# Patient Record
Sex: FEMALE | Race: WHITE | NOT HISPANIC OR LATINO
[De-identification: names, ages, dates, MRNs, and addresses within clinical notes are randomized per-mention and may not be internally consistent; named-entity substitution may affect disease eponyms.]

---

## 2017-03-04 PROBLEM — Z00.00 ENCOUNTER FOR PREVENTIVE HEALTH EXAMINATION: Status: ACTIVE | Noted: 2017-03-04

## 2017-03-09 NOTE — ASU PATIENT PROFILE, ADULT - PSH
H/O  section    S/P endometrial ablation Elective surgery  Right ear tube placement  H/O  section    S/P carpal tunnel release  Bilateral  S/P endometrial ablation

## 2017-03-10 ENCOUNTER — TRANSCRIPTION ENCOUNTER (OUTPATIENT)
Age: 52
End: 2017-03-10

## 2017-03-10 ENCOUNTER — OUTPATIENT (OUTPATIENT)
Dept: OUTPATIENT SERVICES | Facility: HOSPITAL | Age: 52
LOS: 1 days | End: 2017-03-10
Payer: COMMERCIAL

## 2017-03-10 VITALS
HEART RATE: 85 BPM | HEIGHT: 62 IN | DIASTOLIC BLOOD PRESSURE: 42 MMHG | RESPIRATION RATE: 16 BRPM | TEMPERATURE: 98 F | WEIGHT: 146.39 LBS | OXYGEN SATURATION: 100 % | SYSTOLIC BLOOD PRESSURE: 127 MMHG

## 2017-03-10 VITALS
OXYGEN SATURATION: 97 % | SYSTOLIC BLOOD PRESSURE: 103 MMHG | RESPIRATION RATE: 14 BRPM | DIASTOLIC BLOOD PRESSURE: 55 MMHG

## 2017-03-10 DIAGNOSIS — Z98.890 OTHER SPECIFIED POSTPROCEDURAL STATES: Chronic | ICD-10-CM

## 2017-03-10 DIAGNOSIS — H25.812 COMBINED FORMS OF AGE-RELATED CATARACT, LEFT EYE: ICD-10-CM

## 2017-03-10 DIAGNOSIS — Z98.89 OTHER SPECIFIED POSTPROCEDURAL STATES: Chronic | ICD-10-CM

## 2017-03-10 DIAGNOSIS — Z98.891 HISTORY OF UTERINE SCAR FROM PREVIOUS SURGERY: Chronic | ICD-10-CM

## 2017-03-10 DIAGNOSIS — Z41.9 ENCOUNTER FOR PROCEDURE FOR PURPOSES OTHER THAN REMEDYING HEALTH STATE, UNSPECIFIED: Chronic | ICD-10-CM

## 2017-03-10 PROCEDURE — 81025 URINE PREGNANCY TEST: CPT

## 2017-03-10 PROCEDURE — 66984 XCAPSL CTRC RMVL W/O ECP: CPT | Mod: LT

## 2017-03-10 NOTE — ASU DISCHARGE PLAN (ADULT/PEDIATRIC). - PT EDUC
Implant card (specify)/Intraocular lens implant (IOL) Eye shield with instructions , sunglasses and eye kit given to patient.

## 2017-03-10 NOTE — ASU DISCHARGE PLAN (ADULT/PEDIATRIC). - NOTIFY
Swelling that continues/Persistent Nausea and Vomiting/Bleeding that does not stop/Pain not relieved by Medications/Fever greater than 101

## 2017-05-30 PROBLEM — K21.9 GASTRO-ESOPHAGEAL REFLUX DISEASE WITHOUT ESOPHAGITIS: Chronic | Status: ACTIVE | Noted: 2017-03-09

## 2017-06-26 ENCOUNTER — TRANSCRIPTION ENCOUNTER (OUTPATIENT)
Age: 52
End: 2017-06-26

## 2017-06-26 ENCOUNTER — OUTPATIENT (OUTPATIENT)
Dept: OUTPATIENT SERVICES | Facility: HOSPITAL | Age: 52
LOS: 1 days | End: 2017-06-26
Payer: COMMERCIAL

## 2017-06-26 VITALS
RESPIRATION RATE: 15 BRPM | HEART RATE: 88 BPM | DIASTOLIC BLOOD PRESSURE: 64 MMHG | OXYGEN SATURATION: 100 % | SYSTOLIC BLOOD PRESSURE: 136 MMHG

## 2017-06-26 VITALS
HEIGHT: 62 IN | OXYGEN SATURATION: 99 % | TEMPERATURE: 98 F | RESPIRATION RATE: 17 BRPM | WEIGHT: 148.59 LBS | SYSTOLIC BLOOD PRESSURE: 124 MMHG | DIASTOLIC BLOOD PRESSURE: 65 MMHG | HEART RATE: 81 BPM

## 2017-06-26 DIAGNOSIS — Z98.890 OTHER SPECIFIED POSTPROCEDURAL STATES: Chronic | ICD-10-CM

## 2017-06-26 DIAGNOSIS — Z98.89 OTHER SPECIFIED POSTPROCEDURAL STATES: Chronic | ICD-10-CM

## 2017-06-26 DIAGNOSIS — H25.811 COMBINED FORMS OF AGE-RELATED CATARACT, RIGHT EYE: ICD-10-CM

## 2017-06-26 DIAGNOSIS — Z98.42 CATARACT EXTRACTION STATUS, LEFT EYE: Chronic | ICD-10-CM

## 2017-06-26 DIAGNOSIS — Z98.891 HISTORY OF UTERINE SCAR FROM PREVIOUS SURGERY: Chronic | ICD-10-CM

## 2017-06-26 DIAGNOSIS — Z41.9 ENCOUNTER FOR PROCEDURE FOR PURPOSES OTHER THAN REMEDYING HEALTH STATE, UNSPECIFIED: Chronic | ICD-10-CM

## 2017-06-26 PROCEDURE — 81025 URINE PREGNANCY TEST: CPT

## 2017-06-26 PROCEDURE — 66984 XCAPSL CTRC RMVL W/O ECP: CPT | Mod: RT

## 2017-06-26 NOTE — ASU PATIENT PROFILE, ADULT - PSH
Elective surgery  Right ear tube placement  H/O  section    S/P carpal tunnel release  Bilateral  S/P cataract surgery, left    S/P endometrial ablation

## 2018-01-22 ENCOUNTER — OUTPATIENT (OUTPATIENT)
Dept: OUTPATIENT SERVICES | Facility: HOSPITAL | Age: 53
LOS: 1 days | End: 2018-01-22
Payer: COMMERCIAL

## 2018-01-22 VITALS
TEMPERATURE: 98 F | WEIGHT: 156.97 LBS | SYSTOLIC BLOOD PRESSURE: 116 MMHG | DIASTOLIC BLOOD PRESSURE: 71 MMHG | OXYGEN SATURATION: 100 % | RESPIRATION RATE: 16 BRPM | HEIGHT: 62 IN | HEART RATE: 80 BPM

## 2018-01-22 DIAGNOSIS — Z98.891 HISTORY OF UTERINE SCAR FROM PREVIOUS SURGERY: Chronic | ICD-10-CM

## 2018-01-22 DIAGNOSIS — Z98.89 OTHER SPECIFIED POSTPROCEDURAL STATES: Chronic | ICD-10-CM

## 2018-01-22 DIAGNOSIS — E10.9 TYPE 1 DIABETES MELLITUS WITHOUT COMPLICATIONS: ICD-10-CM

## 2018-01-22 DIAGNOSIS — M16.11 UNILATERAL PRIMARY OSTEOARTHRITIS, RIGHT HIP: ICD-10-CM

## 2018-01-22 DIAGNOSIS — Z98.42 CATARACT EXTRACTION STATUS, LEFT EYE: Chronic | ICD-10-CM

## 2018-01-22 DIAGNOSIS — Z98.890 OTHER SPECIFIED POSTPROCEDURAL STATES: Chronic | ICD-10-CM

## 2018-01-22 DIAGNOSIS — Z01.818 ENCOUNTER FOR OTHER PREPROCEDURAL EXAMINATION: ICD-10-CM

## 2018-01-22 DIAGNOSIS — G47.30 SLEEP APNEA, UNSPECIFIED: ICD-10-CM

## 2018-01-22 DIAGNOSIS — Z41.9 ENCOUNTER FOR PROCEDURE FOR PURPOSES OTHER THAN REMEDYING HEALTH STATE, UNSPECIFIED: Chronic | ICD-10-CM

## 2018-01-22 LAB
ANION GAP SERPL CALC-SCNC: 15 MMOL/L — SIGNIFICANT CHANGE UP (ref 5–17)
BLD GP AB SCN SERPL QL: NEGATIVE — SIGNIFICANT CHANGE UP
BUN SERPL-MCNC: 14 MG/DL — SIGNIFICANT CHANGE UP (ref 7–23)
CALCIUM SERPL-MCNC: 9.8 MG/DL — SIGNIFICANT CHANGE UP (ref 8.4–10.5)
CHLORIDE SERPL-SCNC: 100 MMOL/L — SIGNIFICANT CHANGE UP (ref 96–108)
CO2 SERPL-SCNC: 22 MMOL/L — SIGNIFICANT CHANGE UP (ref 22–31)
CREAT SERPL-MCNC: 0.69 MG/DL — SIGNIFICANT CHANGE UP (ref 0.5–1.3)
GLUCOSE SERPL-MCNC: 233 MG/DL — HIGH (ref 70–99)
HBA1C BLD-MCNC: 7.9 % — HIGH (ref 4–5.6)
HCT VFR BLD CALC: 39.6 % — SIGNIFICANT CHANGE UP (ref 34.5–45)
HGB BLD-MCNC: 12.7 G/DL — SIGNIFICANT CHANGE UP (ref 11.5–15.5)
MCHC RBC-ENTMCNC: 30.5 PG — SIGNIFICANT CHANGE UP (ref 27–34)
MCHC RBC-ENTMCNC: 32.1 GM/DL — SIGNIFICANT CHANGE UP (ref 32–36)
MCV RBC AUTO: 95.2 FL — SIGNIFICANT CHANGE UP (ref 80–100)
MRSA PCR RESULT.: SIGNIFICANT CHANGE UP
PLATELET # BLD AUTO: 250 K/UL — SIGNIFICANT CHANGE UP (ref 150–400)
POTASSIUM SERPL-MCNC: 4.2 MMOL/L — SIGNIFICANT CHANGE UP (ref 3.5–5.3)
POTASSIUM SERPL-SCNC: 4.2 MMOL/L — SIGNIFICANT CHANGE UP (ref 3.5–5.3)
RBC # BLD: 4.16 M/UL — SIGNIFICANT CHANGE UP (ref 3.8–5.2)
RBC # FLD: 13.2 % — SIGNIFICANT CHANGE UP (ref 10.3–14.5)
RH IG SCN BLD-IMP: POSITIVE — SIGNIFICANT CHANGE UP
S AUREUS DNA NOSE QL NAA+PROBE: DETECTED
SODIUM SERPL-SCNC: 137 MMOL/L — SIGNIFICANT CHANGE UP (ref 135–145)
WBC # BLD: 7.95 K/UL — SIGNIFICANT CHANGE UP (ref 3.8–10.5)
WBC # FLD AUTO: 7.95 K/UL — SIGNIFICANT CHANGE UP (ref 3.8–10.5)

## 2018-01-22 PROCEDURE — 87641 MR-STAPH DNA AMP PROBE: CPT

## 2018-01-22 PROCEDURE — 86901 BLOOD TYPING SEROLOGIC RH(D): CPT

## 2018-01-22 PROCEDURE — G0463: CPT

## 2018-01-22 PROCEDURE — 85027 COMPLETE CBC AUTOMATED: CPT

## 2018-01-22 PROCEDURE — 87640 STAPH A DNA AMP PROBE: CPT

## 2018-01-22 PROCEDURE — 86900 BLOOD TYPING SEROLOGIC ABO: CPT

## 2018-01-22 PROCEDURE — 83036 HEMOGLOBIN GLYCOSYLATED A1C: CPT

## 2018-01-22 PROCEDURE — 86850 RBC ANTIBODY SCREEN: CPT

## 2018-01-22 PROCEDURE — 80048 BASIC METABOLIC PNL TOTAL CA: CPT

## 2018-01-22 RX ORDER — CEFAZOLIN SODIUM 1 G
2000 VIAL (EA) INJECTION ONCE
Qty: 0 | Refills: 0 | Status: DISCONTINUED | OUTPATIENT
Start: 2018-02-05 | End: 2018-02-08

## 2018-01-22 RX ORDER — PANTOPRAZOLE SODIUM 20 MG/1
40 TABLET, DELAYED RELEASE ORAL ONCE
Qty: 0 | Refills: 0 | Status: COMPLETED | OUTPATIENT
Start: 2018-02-05 | End: 2018-02-05

## 2018-01-22 RX ORDER — IBUPROFEN 200 MG
200 TABLET ORAL
Qty: 0 | Refills: 0 | COMMUNITY

## 2018-01-22 RX ORDER — PRAMLINTIDE ACETATE 1000 UG/ML
1 INJECTION SUBCUTANEOUS
Qty: 0 | Refills: 0 | COMMUNITY

## 2018-01-22 RX ORDER — GABAPENTIN 400 MG/1
300 CAPSULE ORAL ONCE
Qty: 0 | Refills: 0 | Status: COMPLETED | OUTPATIENT
Start: 2018-02-05 | End: 2018-02-05

## 2018-01-22 RX ORDER — ACETAMINOPHEN 500 MG
975 TABLET ORAL ONCE
Qty: 0 | Refills: 0 | Status: COMPLETED | OUTPATIENT
Start: 2018-02-05 | End: 2018-02-05

## 2018-01-22 RX ORDER — TRAMADOL HYDROCHLORIDE 50 MG/1
50 TABLET ORAL ONCE
Qty: 0 | Refills: 0 | Status: DISCONTINUED | OUTPATIENT
Start: 2018-02-05 | End: 2018-02-05

## 2018-01-22 RX ORDER — LIDOCAINE HCL 20 MG/ML
0.2 VIAL (ML) INJECTION ONCE
Qty: 0 | Refills: 0 | Status: DISCONTINUED | OUTPATIENT
Start: 2018-02-05 | End: 2018-02-08

## 2018-01-22 RX ORDER — METOPROLOL TARTRATE 50 MG
1 TABLET ORAL
Qty: 0 | Refills: 0 | COMMUNITY

## 2018-01-22 NOTE — H&P PST ADULT - PROBLEM SELECTOR PLAN 2
Endocrinologist Dr. Anne Huston spoke with patient over the phone and discussed  preop instructions. As of today, the surgery tentative time is at afternoon, patient was instructed to change her pump setting to basal temporary rate at 80% the night prior procedure. Patient completed Self- Management Insulin pump documentation today.   FS on admission .   Hold Symlin Pen the morning of procedure.

## 2018-01-22 NOTE — H&P PST ADULT - NSANTHOSAYNRD_GEN_A_CORE
No. AMBIKA screening performed.  STOP BANG Legend: 0-2 = LOW Risk; 3-4 = INTERMEDIATE Risk; 5-8 = HIGH Risk/mild ( was tested in the past ) 1/2018 by ENT, Dr. Crocker  ( [place]) mild ( was tested) 1/2018 by ENT, Dr. Crocker  ( [place])/No. AMBIKA screening performed.  STOP BANG Legend: 0-2 = LOW Risk; 3-4 = INTERMEDIATE Risk; 5-8 = HIGH Risk

## 2018-01-22 NOTE — H&P PST ADULT - PMH
Ear fullness, bilateral    Essential hypertension    GERD (gastroesophageal reflux disease)    Hyperlipidemia, unspecified hyperlipidemia type    Primary open angle glaucoma of both eyes, unspecified glaucoma stage    Type 1 diabetes mellitus without complication, with long-term current use of insulin  Insulin Pump, Medtronic  Vertigo Ear fullness, bilateral  chronic, slightly deminished hearing  Essential hypertension  Denies HTN, on preventative Metoprolol ( due to DM1)  GERD (gastroesophageal reflux disease)    Hyperlipidemia, unspecified hyperlipidemia type  Denies HLD, on preventative Crestor ( due to DM1)  Primary open angle glaucoma of both eyes, unspecified glaucoma stage    Primary osteoarthritis of right hip    Sleep apnea  mild, no CPAP  Type 1 diabetes mellitus without complication, with long-term current use of insulin  Insulin Pump for 30 years, [place],  diagnosed 40 years ago

## 2018-01-22 NOTE — H&P PST ADULT - HISTORY OF PRESENT ILLNESS
52 yr old female with Type 1 DM, on insulin pump , using Novolog , presents to PST for scheduled right total hip replacement. 52 yr old female with DM1, on insulin pump, mild AMBIKA, osteoarthritis of the right hip,  presents to PST for scheduled right total hip replacement on 2/5/18. Denies fever, chills, no acute complaints.     Endocrinologist Dr. Anne Huston spoke with patient over the phone and discussed  preop instructions. As of today, the surgery tentative time is at afternoon, patient was instructed to change her pump setting to basal temporary rate at 80% the night prior procedure. Patient completed Self- Management Insulin pump documentation today. Patient being diabetic over 40 years, on Insulin pump for 30 years, also diabetic educator and dietitian. Reports most recent Hg A1c -7.5%.

## 2018-01-22 NOTE — H&P PST ADULT - PSH
Elective surgery  Right ear tube placement, came out later , deminished hearing  H/O  section    S/P carpal tunnel release  Bilateral    S/P cataract surgery, left  and right 2017  S/P endometrial ablation  2016 Elective surgery  Right ear tube placement, came out later , diminished hearing  H/O  section    S/P carpal tunnel release  Bilateral    S/P cataract surgery, left  and right 2017  S/P endometrial ablation  2016

## 2018-01-22 NOTE — H&P PST ADULT - OTHER CARE PROVIDERS
endocrinologist Dr. Nidia Reynolds [phone] ( last seen 1/2018 ) [facility]  [phone] , last seen 12/2017 endocrinologist Dr. Nidia Reynolds [phone] ( last seen 1/2018, has appointment next week ) [facility]  [phone] , last seen 12/2017

## 2018-01-22 NOTE — H&P PST ADULT - FAMILY HISTORY
Father  Still living? No  Family history of bladder cancer, Age at diagnosis: Age Unknown  Family history of heart disease, Age at diagnosis: Age Unknown     Mother  Still living? No  Family history of kidney cancer, Age at diagnosis: Age Unknown

## 2018-01-22 NOTE — H&P PST ADULT - PROBLEM SELECTOR PLAN 1
Right total hip replacement   PST instruction provided, soap given, patient verbalized understanding.   CBC, BMP, Hg A1C, T&S , MRSA collected and send.   PCP note in the chart " acceptable candidate for surgery "  Patient attending ortho class today.

## 2018-01-23 PROBLEM — E11.9 TYPE 2 DIABETES MELLITUS WITHOUT COMPLICATIONS: Chronic | Status: INACTIVE | Noted: 2017-03-09 | Resolved: 2018-01-22

## 2018-01-23 RX ORDER — MUPIROCIN 20 MG/G
1 OINTMENT TOPICAL
Qty: 22 | Refills: 0 | OUTPATIENT
Start: 2018-01-23 | End: 2018-01-27

## 2018-02-05 ENCOUNTER — TRANSCRIPTION ENCOUNTER (OUTPATIENT)
Age: 53
End: 2018-02-05

## 2018-02-05 ENCOUNTER — INPATIENT (INPATIENT)
Facility: HOSPITAL | Age: 53
LOS: 2 days | Discharge: HOME | DRG: 470 | End: 2018-02-08
Attending: ORTHOPAEDIC SURGERY | Admitting: ORTHOPAEDIC SURGERY
Payer: COMMERCIAL

## 2018-02-05 VITALS
OXYGEN SATURATION: 100 % | HEIGHT: 62 IN | DIASTOLIC BLOOD PRESSURE: 76 MMHG | RESPIRATION RATE: 20 BRPM | TEMPERATURE: 98 F | SYSTOLIC BLOOD PRESSURE: 119 MMHG | HEART RATE: 89 BPM | WEIGHT: 156.09 LBS

## 2018-02-05 DIAGNOSIS — Z41.9 ENCOUNTER FOR PROCEDURE FOR PURPOSES OTHER THAN REMEDYING HEALTH STATE, UNSPECIFIED: Chronic | ICD-10-CM

## 2018-02-05 DIAGNOSIS — Z98.890 OTHER SPECIFIED POSTPROCEDURAL STATES: Chronic | ICD-10-CM

## 2018-02-05 DIAGNOSIS — Z98.891 HISTORY OF UTERINE SCAR FROM PREVIOUS SURGERY: Chronic | ICD-10-CM

## 2018-02-05 DIAGNOSIS — Z96.41 PRESENCE OF INSULIN PUMP (EXTERNAL) (INTERNAL): ICD-10-CM

## 2018-02-05 DIAGNOSIS — E10.36 TYPE 1 DIABETES MELLITUS WITH DIABETIC CATARACT: ICD-10-CM

## 2018-02-05 DIAGNOSIS — I10 ESSENTIAL (PRIMARY) HYPERTENSION: ICD-10-CM

## 2018-02-05 DIAGNOSIS — M16.11 UNILATERAL PRIMARY OSTEOARTHRITIS, RIGHT HIP: ICD-10-CM

## 2018-02-05 DIAGNOSIS — Z98.42 CATARACT EXTRACTION STATUS, LEFT EYE: Chronic | ICD-10-CM

## 2018-02-05 DIAGNOSIS — E78.5 HYPERLIPIDEMIA, UNSPECIFIED: ICD-10-CM

## 2018-02-05 LAB
ANION GAP SERPL CALC-SCNC: 4 MMOL/L — LOW (ref 5–17)
BUN SERPL-MCNC: 12 MG/DL — SIGNIFICANT CHANGE UP (ref 7–23)
CALCIUM SERPL-MCNC: 8.3 MG/DL — LOW (ref 8.4–10.5)
CHLORIDE SERPL-SCNC: 105 MMOL/L — SIGNIFICANT CHANGE UP (ref 96–108)
CO2 SERPL-SCNC: 28 MMOL/L — SIGNIFICANT CHANGE UP (ref 22–31)
CREAT SERPL-MCNC: 0.58 MG/DL — SIGNIFICANT CHANGE UP (ref 0.5–1.3)
GLUCOSE BLDC GLUCOMTR-MCNC: 148 MG/DL — HIGH (ref 70–99)
GLUCOSE BLDC GLUCOMTR-MCNC: 169 MG/DL — HIGH (ref 70–99)
GLUCOSE BLDC GLUCOMTR-MCNC: 245 MG/DL — HIGH (ref 70–99)
GLUCOSE BLDC GLUCOMTR-MCNC: 253 MG/DL — HIGH (ref 70–99)
GLUCOSE SERPL-MCNC: 253 MG/DL — HIGH (ref 70–99)
HCG UR QL: NEGATIVE — SIGNIFICANT CHANGE UP
HCT VFR BLD CALC: 34 % — LOW (ref 34.5–45)
HGB BLD-MCNC: 12.1 G/DL — SIGNIFICANT CHANGE UP (ref 11.5–15.5)
MCHC RBC-ENTMCNC: 34.3 PG — HIGH (ref 27–34)
MCHC RBC-ENTMCNC: 35.6 GM/DL — SIGNIFICANT CHANGE UP (ref 32–36)
MCV RBC AUTO: 96.5 FL — SIGNIFICANT CHANGE UP (ref 80–100)
PLATELET # BLD AUTO: 220 K/UL — SIGNIFICANT CHANGE UP (ref 150–400)
POTASSIUM SERPL-MCNC: 4.4 MMOL/L — SIGNIFICANT CHANGE UP (ref 3.5–5.3)
POTASSIUM SERPL-SCNC: 4.4 MMOL/L — SIGNIFICANT CHANGE UP (ref 3.5–5.3)
RBC # BLD: 3.53 M/UL — LOW (ref 3.8–5.2)
RBC # FLD: 11.2 % — SIGNIFICANT CHANGE UP (ref 10.3–14.5)
RH IG SCN BLD-IMP: POSITIVE — SIGNIFICANT CHANGE UP
SODIUM SERPL-SCNC: 137 MMOL/L — SIGNIFICANT CHANGE UP (ref 135–145)
WBC # BLD: 14.2 K/UL — HIGH (ref 3.8–10.5)
WBC # FLD AUTO: 14.2 K/UL — HIGH (ref 3.8–10.5)

## 2018-02-05 PROCEDURE — 99223 1ST HOSP IP/OBS HIGH 75: CPT | Mod: GC

## 2018-02-05 PROCEDURE — 72170 X-RAY EXAM OF PELVIS: CPT | Mod: 26

## 2018-02-05 RX ORDER — ATORVASTATIN CALCIUM 80 MG/1
20 TABLET, FILM COATED ORAL AT BEDTIME
Qty: 0 | Refills: 0 | Status: DISCONTINUED | OUTPATIENT
Start: 2018-02-05 | End: 2018-02-08

## 2018-02-05 RX ORDER — DEXTROSE 50 % IN WATER 50 %
25 SYRINGE (ML) INTRAVENOUS ONCE
Qty: 0 | Refills: 0 | Status: DISCONTINUED | OUTPATIENT
Start: 2018-02-05 | End: 2018-02-08

## 2018-02-05 RX ORDER — INSULIN ASPART 100 [IU]/ML
1 INJECTION, SOLUTION SUBCUTANEOUS
Qty: 0 | Refills: 0 | Status: DISCONTINUED | OUTPATIENT
Start: 2018-02-05 | End: 2018-02-05

## 2018-02-05 RX ORDER — DOCUSATE SODIUM 100 MG
100 CAPSULE ORAL THREE TIMES A DAY
Qty: 0 | Refills: 0 | Status: DISCONTINUED | OUTPATIENT
Start: 2018-02-05 | End: 2018-02-08

## 2018-02-05 RX ORDER — SODIUM CHLORIDE 9 MG/ML
500 INJECTION INTRAMUSCULAR; INTRAVENOUS; SUBCUTANEOUS ONCE
Qty: 0 | Refills: 0 | Status: COMPLETED | OUTPATIENT
Start: 2018-02-05 | End: 2018-02-05

## 2018-02-05 RX ORDER — OXYCODONE HYDROCHLORIDE 5 MG/1
10 TABLET ORAL EVERY 4 HOURS
Qty: 0 | Refills: 0 | Status: DISCONTINUED | OUTPATIENT
Start: 2018-02-05 | End: 2018-02-08

## 2018-02-05 RX ORDER — FONDAPARINUX SODIUM 2.5 MG/.5ML
2.5 INJECTION, SOLUTION SUBCUTANEOUS DAILY
Qty: 0 | Refills: 0 | Status: DISCONTINUED | OUTPATIENT
Start: 2018-02-06 | End: 2018-02-08

## 2018-02-05 RX ORDER — KETOROLAC TROMETHAMINE 30 MG/ML
15 SYRINGE (ML) INJECTION ONCE
Qty: 0 | Refills: 0 | Status: DISCONTINUED | OUTPATIENT
Start: 2018-02-06 | End: 2018-02-08

## 2018-02-05 RX ORDER — ROSUVASTATIN CALCIUM 5 MG/1
1 TABLET ORAL
Qty: 0 | Refills: 0 | COMMUNITY

## 2018-02-05 RX ORDER — TRAMADOL HYDROCHLORIDE 50 MG/1
50 TABLET ORAL EVERY 8 HOURS
Qty: 0 | Refills: 0 | Status: DISCONTINUED | OUTPATIENT
Start: 2018-02-05 | End: 2018-02-08

## 2018-02-05 RX ORDER — SODIUM CHLORIDE 9 MG/ML
1000 INJECTION, SOLUTION INTRAVENOUS
Qty: 0 | Refills: 0 | Status: DISCONTINUED | OUTPATIENT
Start: 2018-02-05 | End: 2018-02-08

## 2018-02-05 RX ORDER — PRAMLINTIDE ACETATE 1000 UG/ML
60 INJECTION SUBCUTANEOUS
Qty: 0 | Refills: 0 | COMMUNITY

## 2018-02-05 RX ORDER — POLYETHYLENE GLYCOL 3350 17 G/17G
17 POWDER, FOR SOLUTION ORAL DAILY
Qty: 0 | Refills: 0 | Status: DISCONTINUED | OUTPATIENT
Start: 2018-02-05 | End: 2018-02-08

## 2018-02-05 RX ORDER — CEFAZOLIN SODIUM 1 G
2000 VIAL (EA) INJECTION EVERY 8 HOURS
Qty: 0 | Refills: 0 | Status: COMPLETED | OUTPATIENT
Start: 2018-02-05 | End: 2018-02-06

## 2018-02-05 RX ORDER — ACETAMINOPHEN 500 MG
650 TABLET ORAL EVERY 6 HOURS
Qty: 0 | Refills: 0 | Status: DISCONTINUED | OUTPATIENT
Start: 2018-02-05 | End: 2018-02-08

## 2018-02-05 RX ORDER — ONDANSETRON 8 MG/1
4 TABLET, FILM COATED ORAL EVERY 4 HOURS
Qty: 0 | Refills: 0 | Status: DISCONTINUED | OUTPATIENT
Start: 2018-02-05 | End: 2018-02-05

## 2018-02-05 RX ORDER — OXYCODONE HYDROCHLORIDE 5 MG/1
5 TABLET ORAL EVERY 4 HOURS
Qty: 0 | Refills: 0 | Status: DISCONTINUED | OUTPATIENT
Start: 2018-02-05 | End: 2018-02-08

## 2018-02-05 RX ORDER — HYDROMORPHONE HYDROCHLORIDE 2 MG/ML
0.3 INJECTION INTRAMUSCULAR; INTRAVENOUS; SUBCUTANEOUS
Qty: 0 | Refills: 0 | Status: DISCONTINUED | OUTPATIENT
Start: 2018-02-05 | End: 2018-02-05

## 2018-02-05 RX ORDER — METOPROLOL TARTRATE 50 MG
25 TABLET ORAL DAILY
Qty: 0 | Refills: 0 | Status: DISCONTINUED | OUTPATIENT
Start: 2018-02-05 | End: 2018-02-08

## 2018-02-05 RX ORDER — GLUCAGON INJECTION, SOLUTION 0.5 MG/.1ML
1 INJECTION, SOLUTION SUBCUTANEOUS ONCE
Qty: 0 | Refills: 0 | Status: DISCONTINUED | OUTPATIENT
Start: 2018-02-05 | End: 2018-02-08

## 2018-02-05 RX ORDER — SODIUM CHLORIDE 9 MG/ML
3 INJECTION INTRAMUSCULAR; INTRAVENOUS; SUBCUTANEOUS EVERY 8 HOURS
Qty: 0 | Refills: 0 | Status: DISCONTINUED | OUTPATIENT
Start: 2018-02-05 | End: 2018-02-05

## 2018-02-05 RX ORDER — ONDANSETRON 8 MG/1
4 TABLET, FILM COATED ORAL EVERY 6 HOURS
Qty: 0 | Refills: 0 | Status: DISCONTINUED | OUTPATIENT
Start: 2018-02-05 | End: 2018-02-08

## 2018-02-05 RX ORDER — HYDROMORPHONE HYDROCHLORIDE 2 MG/ML
0.5 INJECTION INTRAMUSCULAR; INTRAVENOUS; SUBCUTANEOUS EVERY 4 HOURS
Qty: 0 | Refills: 0 | Status: DISCONTINUED | OUTPATIENT
Start: 2018-02-05 | End: 2018-02-08

## 2018-02-05 RX ORDER — OMEPRAZOLE 10 MG/1
1 CAPSULE, DELAYED RELEASE ORAL
Qty: 0 | Refills: 0 | COMMUNITY

## 2018-02-05 RX ORDER — SODIUM CHLORIDE 9 MG/ML
1000 INJECTION INTRAMUSCULAR; INTRAVENOUS; SUBCUTANEOUS ONCE
Qty: 0 | Refills: 0 | Status: COMPLETED | OUTPATIENT
Start: 2018-02-06 | End: 2018-02-06

## 2018-02-05 RX ORDER — LATANOPROST 0.05 MG/ML
1 SOLUTION/ DROPS OPHTHALMIC; TOPICAL AT BEDTIME
Qty: 0 | Refills: 0 | Status: DISCONTINUED | OUTPATIENT
Start: 2018-02-05 | End: 2018-02-08

## 2018-02-05 RX ORDER — DEXTROSE 50 % IN WATER 50 %
12.5 SYRINGE (ML) INTRAVENOUS ONCE
Qty: 0 | Refills: 0 | Status: DISCONTINUED | OUTPATIENT
Start: 2018-02-05 | End: 2018-02-08

## 2018-02-05 RX ORDER — SENNA PLUS 8.6 MG/1
2 TABLET ORAL AT BEDTIME
Qty: 0 | Refills: 0 | Status: DISCONTINUED | OUTPATIENT
Start: 2018-02-05 | End: 2018-02-08

## 2018-02-05 RX ORDER — DEXTROSE 50 % IN WATER 50 %
1 SYRINGE (ML) INTRAVENOUS ONCE
Qty: 0 | Refills: 0 | Status: DISCONTINUED | OUTPATIENT
Start: 2018-02-05 | End: 2018-02-08

## 2018-02-05 RX ORDER — LATANOPROST 0.05 MG/ML
1 SOLUTION/ DROPS OPHTHALMIC; TOPICAL
Qty: 0 | Refills: 0 | COMMUNITY

## 2018-02-05 RX ORDER — METOPROLOL TARTRATE 50 MG
1 TABLET ORAL
Qty: 0 | Refills: 0 | COMMUNITY

## 2018-02-05 RX ORDER — INSULIN ASPART 100 [IU]/ML
1 INJECTION, SOLUTION SUBCUTANEOUS
Qty: 0 | Refills: 0 | Status: DISCONTINUED | OUTPATIENT
Start: 2018-02-05 | End: 2018-02-07

## 2018-02-05 RX ORDER — INSULIN ASPART 100 [IU]/ML
0 INJECTION, SOLUTION SUBCUTANEOUS
Qty: 0 | Refills: 0 | COMMUNITY

## 2018-02-05 RX ORDER — SODIUM CHLORIDE 9 MG/ML
1000 INJECTION INTRAMUSCULAR; INTRAVENOUS; SUBCUTANEOUS
Qty: 0 | Refills: 0 | Status: DISCONTINUED | OUTPATIENT
Start: 2018-02-05 | End: 2018-02-08

## 2018-02-05 RX ORDER — MAGNESIUM HYDROXIDE 400 MG/1
30 TABLET, CHEWABLE ORAL DAILY
Qty: 0 | Refills: 0 | Status: DISCONTINUED | OUTPATIENT
Start: 2018-02-05 | End: 2018-02-08

## 2018-02-05 RX ADMIN — TRAMADOL HYDROCHLORIDE 50 MILLIGRAM(S): 50 TABLET ORAL at 21:37

## 2018-02-05 RX ADMIN — SODIUM CHLORIDE 80 MILLILITER(S): 9 INJECTION INTRAMUSCULAR; INTRAVENOUS; SUBCUTANEOUS at 16:00

## 2018-02-05 RX ADMIN — TRAMADOL HYDROCHLORIDE 50 MILLIGRAM(S): 50 TABLET ORAL at 21:27

## 2018-02-05 RX ADMIN — OXYCODONE HYDROCHLORIDE 5 MILLIGRAM(S): 5 TABLET ORAL at 21:47

## 2018-02-05 RX ADMIN — HYDROMORPHONE HYDROCHLORIDE 0.3 MILLIGRAM(S): 2 INJECTION INTRAMUSCULAR; INTRAVENOUS; SUBCUTANEOUS at 16:54

## 2018-02-05 RX ADMIN — Medication 100 MILLIGRAM(S): at 21:28

## 2018-02-05 RX ADMIN — OXYCODONE HYDROCHLORIDE 5 MILLIGRAM(S): 5 TABLET ORAL at 21:37

## 2018-02-05 RX ADMIN — ATORVASTATIN CALCIUM 20 MILLIGRAM(S): 80 TABLET, FILM COATED ORAL at 21:27

## 2018-02-05 RX ADMIN — Medication 975 MILLIGRAM(S): at 10:32

## 2018-02-05 RX ADMIN — TRAMADOL HYDROCHLORIDE 50 MILLIGRAM(S): 50 TABLET ORAL at 10:33

## 2018-02-05 RX ADMIN — HYDROMORPHONE HYDROCHLORIDE 0.3 MILLIGRAM(S): 2 INJECTION INTRAMUSCULAR; INTRAVENOUS; SUBCUTANEOUS at 17:14

## 2018-02-05 RX ADMIN — PANTOPRAZOLE SODIUM 40 MILLIGRAM(S): 20 TABLET, DELAYED RELEASE ORAL at 10:32

## 2018-02-05 RX ADMIN — SODIUM CHLORIDE 666.67 MILLILITER(S): 9 INJECTION INTRAMUSCULAR; INTRAVENOUS; SUBCUTANEOUS at 19:42

## 2018-02-05 RX ADMIN — GABAPENTIN 300 MILLIGRAM(S): 400 CAPSULE ORAL at 10:33

## 2018-02-05 RX ADMIN — LATANOPROST 1 DROP(S): 0.05 SOLUTION/ DROPS OPHTHALMIC; TOPICAL at 21:27

## 2018-02-05 RX ADMIN — Medication 100 MILLIGRAM(S): at 21:27

## 2018-02-05 NOTE — BRIEF OPERATIVE NOTE - ASSISTANT(S)
10/04/2017  Hi Rubio is a 57 y.o., male.    Anesthesia Evaluation    I have reviewed the Patient Summary Reports.    I have reviewed the Nursing Notes.   I have reviewed the Medications.     Review of Systems  Anesthesia Hx:  Denies Family Hx of Anesthesia complications.  Personal Hx of Anesthesia complications  Difficult Intubation (Grade IV view with huber 2; used glidescope sebsequent surgery Grade 1 view, no difficulty  ), documented in Epic anesthesia history   Social:  Former Smoker    Hematology/Oncology:     Oncology Normal     Cardiovascular:   Hypertension CAD  Dysrhythmias (tachyarrythmias on metoprolol)  Angina (due to palpitations and anxiety per patient) hyperlipidemia Negative dobutamine stress 1/2017   Pulmonary:  Pulmonary Normal    Renal/:  Renal/ Normal     Hepatic/GI:   PUD, GERD    Musculoskeletal:   Arthritis   Spine Disorders: cervical Degenerative disease    Endocrine:   Diabetes    Psych:   anxiety          Physical Exam  General:  Well nourished    Airway/Jaw/Neck:  Airway Findings: Mouth Opening: Normal Tongue: Normal  General Airway Assessment: Adult, Possible difficult intubation  Mallampati: III  TM Distance: 4 - 6 cm  Jaw/Neck Findings:  Neck ROM: Extension Decreased, Mod.      Dental:  Dental Findings: In tact        Mental Status:  Mental Status Findings:  Alert and Oriented, Cooperative         Anesthesia Plan  Type of Anesthesia, risks & benefits discussed:  Anesthesia Type:  general  Patient's Preference:   Intra-op Monitoring Plan: standard ASA monitors  Intra-op Monitoring Plan Comments:   Post Op Pain Control Plan:   Post Op Pain Control Plan Comments:   Induction:   IV  Beta Blocker:         Informed Consent: Patient understands risks and agrees with Anesthesia plan.  Questions answered. Anesthesia consent signed with patient.  ASA Score: 3     Day of  GUMARO Bassett PA-C, [facility] [place] PGY1 Surgery Review of History & Physical:    H&P update referred to the surgeon.     Anesthesia Plan Notes: Recent labs normal        Ready For Surgery From Anesthesia Perspective.

## 2018-02-05 NOTE — BRIEF OPERATIVE NOTE - PROCEDURE
<<-----Click on this checkbox to enter Procedure Total hip arthroplasty  02/05/2018  R  Active  [id number]

## 2018-02-05 NOTE — CONSULT NOTE ADULT - PROBLEM SELECTOR RECOMMENDATION 9
-continue current settings, patient can stop the temp basal once she is eating.  -continue to bolus while NPO to correct any high bs  -test tid-ac/qhs  -diet should be diabetic with qhs snack

## 2018-02-05 NOTE — PATIENT PROFILE ADULT. - PMH
Ear fullness, bilateral  chronic, slightly deminished hearing  Essential hypertension  Denies HTN, on preventative Metoprolol ( due to DM1)  GERD (gastroesophageal reflux disease)    Hyperlipidemia, unspecified hyperlipidemia type  Denies HLD, on preventative Crestor ( due to DM1)  Primary open angle glaucoma of both eyes, unspecified glaucoma stage    Primary osteoarthritis of right hip    Sleep apnea  mild, no CPAP  Type 1 diabetes mellitus without complication, with long-term current use of insulin  Insulin Pump for 30 years, [employer],  diagnosed 40 years ago

## 2018-02-05 NOTE — PATIENT PROFILE ADULT. - PSH
Elective surgery  Right ear tube placement, came out later , diminished hearing  H/O  section    S/P carpal tunnel release  Bilateral    S/P cataract surgery, left  and right 2017  S/P endometrial ablation  2016

## 2018-02-05 NOTE — CONSULT NOTE ADULT - SUBJECTIVE AND OBJECTIVE BOX
HPI: 51 yo female with T1DM x 45 years uncontrolled with cataracts and glaucoma s/p laser and surgery here for R hip replacement. For the past couple fo months she has been having increased pain and limited mobility in putting on her socks and shoes. No history of trauma to her R hip or falls. She has been using a CSII for the past 30 years. She last saw her endo 2018 and was told that her HbA1c was 7.7% so her endo adjusted her basal rates and her I;C ratio. She will f/u again in 3 months. She denies problems with the fabio phenomenon and has overnight lows 1x/week or less.  I spoke with patient over the phone when she was in PST and we agreed to her doing a temp basal at 80% the night before surgery, which she did.    Endocrinologist: Dr. Nidia Reynolds. Previously had been Dr. Blackburn ([facility]).    Basal Rate 12am 1.4 units/hour, 330am 1.45 units/hour, 630am 1.25 units/hour, 9am 1.5 units/hour, 1130am 1.3 units/hour  I:C 12am 1:5, 1130am 1;6  ISF 60 12am-12am  Target -150  IOB 4 hours    Last site change: Saturday 2/3/18  Next site change: 18  Insulin used: Novolog    PAST MEDICAL & SURGICAL HISTORY:  Primary osteoarthritis of right hip  Sleep apnea: mild, no CPAP  GERD (gastroesophageal reflux disease)  Ear fullness, bilateral: chronic, slightly deminished hearing  Primary open angle glaucoma of both eyes, unspecified glaucoma stage  Essential hypertension: Denies HTN, on preventative Metoprolol ( due to DM1)  Hyperlipidemia, unspecified hyperlipidemia type: Denies HLD, on preventative Crestor ( due to DM1)  Type 1 diabetes mellitus without complication, with long-term current use of insulin: Insulin Pump for 30 years, Medtronic,  diagnosed 40 years ago  S/P cataract surgery, left: and right 2017  Elective surgery: Right ear tube placement, came out later , deminished hearing  S/P carpal tunnel release: Bilateral    S/P endometrial ablation: 2016  H/O  section:       FAMILY HISTORY:  Family history of heart disease (Father)  Family history of kidney cancer (Mother)  Family history of bladder cancer (Father)  No T1DM, +T2DM in paternal cousins    Social History:  Tobacco: never  ETOH: denies  Occupation: RD/CDE at [employer]    Outpatient Medications: Novolog per pump and Symlin 60 mg tid-ac    MEDICATIONS  (STANDING):  atorvastatin 20 milliGRAM(s) Oral at bedtime  ceFAZolin   IVPB 2000 milliGRAM(s) IV Intermittent every 8 hours  ceFAZolin   IVPB 2000 milliGRAM(s) IV Intermittent once  dextrose 5%. 1000 milliLiter(s) (50 mL/Hr) IV Continuous <Continuous>  dextrose 50% Injectable 12.5 Gram(s) IV Push once  dextrose 50% Injectable 25 Gram(s) IV Push once  dextrose 50% Injectable 25 Gram(s) IV Push once  docusate sodium 100 milliGRAM(s) Oral three times a day  insulin aspart (NovoLOG) Pump 1 Each SubCutaneous Continuous Pump  latanoprost 0.005% Ophthalmic Solution 1 Drop(s) Both EYES at bedtime  lidocaine 1% Injectable 0.2 milliLiter(s) Local Injection once  metoprolol succinate ER 25 milliGRAM(s) Oral daily  polyethylene glycol 3350 17 Gram(s) Oral daily  sodium chloride 0.9% Bolus 500 milliLiter(s) IV Bolus once  sodium chloride 0.9%. 1000 milliLiter(s) (80 mL/Hr) IV Continuous <Continuous>  traMADol 50 milliGRAM(s) Oral every 8 hours    MEDICATIONS  (PRN):  acetaminophen   Tablet 650 milliGRAM(s) Oral every 6 hours PRN For Temp over 38.3 C (100.94 F)  acetaminophen   Tablet 650 milliGRAM(s) Oral every 6 hours PRN mild pain  aluminum hydroxide/magnesium hydroxide/simethicone Suspension 30 milliLiter(s) Oral four times a day PRN Indigestion  dextrose Gel 1 Dose(s) Oral once PRN Blood Glucose LESS THAN 70 milliGRAM(s)/deciliter  glucagon  Injectable 1 milliGRAM(s) IntraMuscular once PRN Glucose LESS THAN 70 milligrams/deciliter  HYDROmorphone  Injectable 0.5 milliGRAM(s) IV Push every 4 hours PRN breakthrough pain  magnesium hydroxide Suspension 30 milliLiter(s) Oral daily PRN Constipation  ondansetron Injectable 4 milliGRAM(s) IV Push every 6 hours PRN Nausea and/or Vomiting  oxyCODONE    IR 5 milliGRAM(s) Oral every 4 hours PRN Moderate Pain (4 - 6)  oxyCODONE    IR 10 milliGRAM(s) Oral every 4 hours PRN Severe Pain (7 - 10)  senna 2 Tablet(s) Oral at bedtime PRN Constipation      Allergies    Bactrim (Hives)    Intolerances      Review of Systems:  Constitutional: No fever, good appetite/po intake  Eyes: No blurry vision, diplopia  Neuro: No tremors  HEENT: No pain  Cardiovascular: No chest pain, palpitations  Respiratory: No SOB, no cough  GI: No nausea, vomiting,   : No dysuria, hematuria  Skin: no rash  Psych: no depression  Endocrine: no polyuria, polydipsia  Hem/lymph: no swelling  Osteoporosis: no fractures    ALL OTHER SYSTEMS REVIEWED AND NEGATIVE    UNABLE TO OBTAIN    PHYSICAL EXAM:  VITALS: T(C): 36.3 (18 @ 19:07)  T(F): 97.3 (18 @ 19:07), Max: 97.9 (18 @ 08:10)  HR: 84 (18 @ 19:07) (74 - 89)  BP: 100/63 (18 @ 19:07) (95/55 - 124/58)  RR:  (16 - 20)  SpO2:  (99% - 100%)  Wt(kg): --  GENERAL: NAD, well-groomed, well-developed  EYES: +EOMI B/L, anicteric  HEENT:  Atraumatic, Normocephalic, moist mucous membranes  RESPIRATORY: Clear to auscultation bilaterally; No rales, rhonchi, wheezing, or rubs  CARDIOVASCULAR: Regular rate and rhythm; No murmurs; no peripheral edema, 2+ dorsal pedis pulses b/l  GI: Soft, nontender, non distended, normal bowel sounds  SKIN: Dry, intact, No rashes or lesions on feet b/l, +catheter in the LLQ - no erythema/exudate  NEURO: sensation intact, extraocular movements intact, no tremor, normal reflexes  PSYCH: +restrictive affect, +sedated mood      POCT Blood Glucose.: 245 mg/dL (18 @ 15:18)  POCT Blood Glucose.: 253 mg/dL (18 @ 08:08)                            12.1   14.2  )-----------( 220      ( 2018 15:44 )             34.0       02-05    137  |  105  |  12  ----------------------------<  253<H>  4.4   |  28  |  0.58    EGFR if : 121  EGFR if non : 104    Ca    8.3<L>      02-05    Hemoglobin A1C, Whole Blood: 7.9 % <H> [4.0 - 5.6] (18 @ 12:49) HPI: 53 yo female with T1DM x 45 years uncontrolled with cataracts and glaucoma s/p laser and surgery here for R hip replacement. For the past couple fo months she has been having increased pain and limited mobility in putting on her socks and shoes. No history of trauma to her R hip or falls. She has been using a CSII for the past 30 years. She last saw her endo 2018 and was told that her HbA1c was 7.7% so her endo adjusted her basal rates and her I;C ratio. She will f/u again in 3 months. She denies problems with the fabio phenomenon and has overnight lows 1x/week or less.  I spoke with patient over the phone when she was in PST and we agreed to her doing a temp basal at 80% the night before surgery, which she did.    Endocrinologist: Dr. Nidia Reynolds. Previously had been Dr. Blackburn ([facility]).    Basal Rate 12am 1.4 units/hour, 330am 1.45 units/hour, 630am 1.25 units/hour, 9am 1.5 units/hour, 1130am 1.3 units/hour  I:C 12am 1:5, 1130am 1;6  ISF 60 12am-12am  Target -150  IOB 4 hours    Last site change: Saturday 2/3/18  Next site change: 18  Insulin used: Novolog    PAST MEDICAL & SURGICAL HISTORY:  Primary osteoarthritis of right hip  Sleep apnea: mild, no CPAP  GERD (gastroesophageal reflux disease)  Ear fullness, bilateral: chronic, slightly deminished hearing  Primary open angle glaucoma of both eyes, unspecified glaucoma stage  Essential hypertension: Denies HTN, on preventative Metoprolol ( due to DM1)  Hyperlipidemia, unspecified hyperlipidemia type: Denies HLD, on preventative Crestor ( due to DM1)  Type 1 diabetes mellitus without complication, with long-term current use of insulin: Insulin Pump for 30 years, [employer]tronic,  diagnosed 40 years ago  S/P cataract surgery, left: and right 2017  Elective surgery: Right ear tube placement, came out later , deminished hearing  S/P carpal tunnel release: Bilateral    S/P endometrial ablation: 2016  H/O  section:       FAMILY HISTORY:  Family history of heart disease (Father)  Family history of kidney cancer (Mother)  Family history of bladder cancer (Father)  No T1DM, +T2DM in paternal cousins    Social History:  Tobacco: never  ETOH: denies  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 1000 milliLiter(s) (50 mL/Hr) IV Continuous <Continuous>  dextrose 50% Injectable 12.5 Gram(s) IV Push once  dextrose 50% Injectable 25 Gram(s) IV Push once  dextrose 50% Injectable 25 Gram(s) IV Push once  docusate sodium 100 milliGRAM(s) Oral three times a day  insulin aspart (NovoLOG) Pump 1 Each SubCutaneous Continuous Pump  latanoprost 0.005% Ophthalmic Solution 1 Drop(s) Both EYES at bedtime  lidocaine 1% Injectable 0.2 milliLiter(s) Local Injection once  metoprolol succinate ER 25 milliGRAM(s) Oral daily  polyethylene glycol 3350 17 Gram(s) Oral daily  sodium chloride 0.9% Bolus 500 milliLiter(s) IV Bolus once  sodium chloride 0.9%. 1000 milliLiter(s) (80 mL/Hr) IV Continuous <Continuous>  traMADol 50 milliGRAM(s) Oral every 8 hours    MEDICATIONS  (PRN):  acetaminophen   Tablet 650 milliGRAM(s) Oral every 6 hours PRN For Temp over 38.3 C (100.94 F)  acetaminophen   Tablet 650 milliGRAM(s) Oral every 6 hours PRN mild pain  aluminum hydroxide/magnesium hydroxide/simethicone Suspension 30 milliLiter(s) Oral four times a day PRN Indigestion  dextrose Gel 1 Dose(s) Oral once PRN Blood Glucose LESS THAN 70 milliGRAM(s)/deciliter  glucagon  Injectable 1 milliGRAM(s) IntraMuscular once PRN Glucose LESS THAN 70 milligrams/deciliter  HYDROmorphone  Injectable 0.5 milliGRAM(s) IV Push every 4 hours PRN breakthrough pain  magnesium hydroxide Suspension 30 milliLiter(s) Oral daily PRN Constipation  ondansetron Injectable 4 milliGRAM(s) IV Push every 6 hours PRN Nausea and/or Vomiting  oxyCODONE    IR 5 milliGRAM(s) Oral every 4 hours PRN Moderate Pain (4 - 6)  oxyCODONE    IR 10 milliGRAM(s) Oral every 4 hours PRN Severe Pain (7 - 10)  senna 2 Tablet(s) Oral at bedtime PRN Constipation      Allergies    Bactrim (Hives)    Intolerances      Review of Systems:  Constitutional: No fever, chills  Neuro: No neuropathy, headache  Cardiovascular: No chest pain, palpitations  Respiratory: No SOB, no cough  GI: No nausea, vomiting,   Endocrine: no polyuria, polydipsia  MS: +R hip pain, no myalgias  ALL OTHER SYSTEMS REVIEWED AND NEGATIVE        PHYSICAL EXAM:  VITALS: T(C): 36.3 (18 @ 19:07)  T(F): 97.3 (18 @ 19:07), Max: 97.9 (18 @ 08:10)  HR: 84 (18 @ 19:07) (74 - 89)  BP: 100/63 (18 @ 19:07) (95/55 - 124/58)  RR:  (16 - 20)  SpO2:  (99% - 100%)  Wt(kg): --  GENERAL: NAD, well-groomed, well-developed  EYES: +EOMI B/L, anicteric  HEENT:  Atraumatic, Normocephalic, moist mucous membranes  RESPIRATORY: Clear to auscultation bilaterally; No rales, rhonchi, wheezing, or rubs  CARDIOVASCULAR: Regular rate and rhythm; No murmurs; no peripheral edema, 2+ dorsal pedis pulses b/l  GI: Soft, nontender, non distended, normal bowel sounds  SKIN: Dry, intact, No rashes or lesions on feet b/l, +catheter in the LLQ - no erythema/exudate  NEURO: sensation intact, extraocular movements intact, no tremor, normal reflexes  PSYCH: +restrictive affect, +sedated mood      POCT Blood Glucose.: 245 mg/dL (18 @ 15:18)  POCT Blood Glucose.: 253 mg/dL (18 @ 08:08)                            12.1   14.2  )-----------( 220      ( 2018 15:44 )             34.0           137  |  105  |  12  ----------------------------<  253<H>  4.4   |  28  |  0.58    EGFR if : 121  EGFR if non : 104    Ca    8.3<L>      02-05    Hemoglobin A1C, Whole Blood: 7.9 % <H> [4.0 - 5.6] (18 @ 12:49)

## 2018-02-05 NOTE — CHART NOTE - NSCHARTNOTEFT_GEN_A_CORE
Patient resting without complaints.  Denies chest pain, SOB, N/V.    T(C): 36.3 (02-05-18 @ 19:07)  T(F): 97.3 (02-05-18 @ 19:07)  HR: 84 (02-05-18 @ 19:07)  BP: 100/63 (02-05-18 @ 19:07)  RR: 16 (02-05-18 @ 19:07)  SpO2: 99% (02-05-18 @ 19:07)      Exam:  Alert and Oriented, No Acute Distress    R Lower Extremity:  Hip Dsg CDI  Calf Soft, Non-tender  +PF/DF  Sensation grossly intact                            12.1   14.2  )-----------( 220      ( 05 Feb 2018 15:44 )             34.0        137  |  105  |  12  ----------------------------<  253<H>  4.4   |  28  |  0.58      Post-op Pelvis XR done.    A/P: 52y Female S/P R SANDIE; Stable  -Pain Control  -DVT PPx; IS  -Am labs  -PT/OT Eval-WBAT RLE  -Appreciate Endocrine note  -Continue Current Tx.    ADDIS NagyC  Orthopedic Surgery  Pagers [phone] Patient resting without complaints.  Denies chest pain, SOB, N/V.    T(C): 36.3 (02-05-18 @ 19:07)  T(F): 97.3 (02-05-18 @ 19:07)  HR: 84 (02-05-18 @ 19:07)  BP: 100/63 (02-05-18 @ 19:07)  RR: 16 (02-05-18 @ 19:07)  SpO2: 99% (02-05-18 @ 19:07)      Exam:  Alert and Oriented, No Acute Distress    R Lower Extremity:  Hip Dsg CDI  Calf Soft, Non-tender  +PF/DF  Sensation grossly intact                            12.1   14.2  )-----------( 220      ( 05 Feb 2018 15:44 )             34.0        137  |  105  |  12  ----------------------------<  253<H>  4.4   |  28  |  0.58      Post-op Pelvis XR done.    A/P: 52y Female S/P R SANDIE; Stable  -Pain Control  -DVT PPx; IS  -Am labs  -PT/OT Eval-WBAT RLE  -Appreciate Endocrinology note  -Continue Current Tx.    ADDIS NagyC  Orthopedic Surgery  Pagers [phone]

## 2018-02-05 NOTE — CONSULT NOTE ADULT - ASSESSMENT
A/P: 53 yo female with hx of T1DM uncontrolled (HbA1C 7.9%) with cataracts and glaucoma here for R hip replacement.

## 2018-02-06 ENCOUNTER — TRANSCRIPTION ENCOUNTER (OUTPATIENT)
Age: 53
End: 2018-02-06

## 2018-02-06 LAB
ANION GAP SERPL CALC-SCNC: 14 MMOL/L — SIGNIFICANT CHANGE UP (ref 5–17)
BUN SERPL-MCNC: 10 MG/DL — SIGNIFICANT CHANGE UP (ref 7–23)
CALCIUM SERPL-MCNC: 8.4 MG/DL — SIGNIFICANT CHANGE UP (ref 8.4–10.5)
CHLORIDE SERPL-SCNC: 99 MMOL/L — SIGNIFICANT CHANGE UP (ref 96–108)
CO2 SERPL-SCNC: 26 MMOL/L — SIGNIFICANT CHANGE UP (ref 22–31)
CREAT SERPL-MCNC: 0.72 MG/DL — SIGNIFICANT CHANGE UP (ref 0.5–1.3)
GLUCOSE BLDC GLUCOMTR-MCNC: 203 MG/DL — HIGH (ref 70–99)
GLUCOSE BLDC GLUCOMTR-MCNC: 211 MG/DL — HIGH (ref 70–99)
GLUCOSE BLDC GLUCOMTR-MCNC: 232 MG/DL — HIGH (ref 70–99)
GLUCOSE BLDC GLUCOMTR-MCNC: 252 MG/DL — HIGH (ref 70–99)
GLUCOSE BLDC GLUCOMTR-MCNC: 264 MG/DL — HIGH (ref 70–99)
GLUCOSE BLDC GLUCOMTR-MCNC: 296 MG/DL — HIGH (ref 70–99)
GLUCOSE SERPL-MCNC: 199 MG/DL — HIGH (ref 70–99)
HCT VFR BLD CALC: 32 % — LOW (ref 34.5–45)
HGB BLD-MCNC: 10.2 G/DL — LOW (ref 11.5–15.5)
MCHC RBC-ENTMCNC: 30.4 PG — SIGNIFICANT CHANGE UP (ref 27–34)
MCHC RBC-ENTMCNC: 31.9 GM/DL — LOW (ref 32–36)
MCV RBC AUTO: 95.2 FL — SIGNIFICANT CHANGE UP (ref 80–100)
PLATELET # BLD AUTO: 264 K/UL — SIGNIFICANT CHANGE UP (ref 150–400)
POTASSIUM SERPL-MCNC: 3.6 MMOL/L — SIGNIFICANT CHANGE UP (ref 3.5–5.3)
POTASSIUM SERPL-SCNC: 3.6 MMOL/L — SIGNIFICANT CHANGE UP (ref 3.5–5.3)
RBC # BLD: 3.36 M/UL — LOW (ref 3.8–5.2)
RBC # FLD: 13.1 % — SIGNIFICANT CHANGE UP (ref 10.3–14.5)
SODIUM SERPL-SCNC: 139 MMOL/L — SIGNIFICANT CHANGE UP (ref 135–145)
WBC # BLD: 13 K/UL — HIGH (ref 3.8–10.5)
WBC # FLD AUTO: 13 K/UL — HIGH (ref 3.8–10.5)

## 2018-02-06 PROCEDURE — 12345: CPT | Mod: NC

## 2018-02-06 PROCEDURE — 99232 SBSQ HOSP IP/OBS MODERATE 35: CPT | Mod: GC

## 2018-02-06 RX ADMIN — OXYCODONE HYDROCHLORIDE 10 MILLIGRAM(S): 5 TABLET ORAL at 20:05

## 2018-02-06 RX ADMIN — OXYCODONE HYDROCHLORIDE 10 MILLIGRAM(S): 5 TABLET ORAL at 11:15

## 2018-02-06 RX ADMIN — Medication 1 TABLET(S): at 12:23

## 2018-02-06 RX ADMIN — TRAMADOL HYDROCHLORIDE 50 MILLIGRAM(S): 50 TABLET ORAL at 06:23

## 2018-02-06 RX ADMIN — TRAMADOL HYDROCHLORIDE 50 MILLIGRAM(S): 50 TABLET ORAL at 22:15

## 2018-02-06 RX ADMIN — SENNA PLUS 2 TABLET(S): 8.6 TABLET ORAL at 21:46

## 2018-02-06 RX ADMIN — TRAMADOL HYDROCHLORIDE 50 MILLIGRAM(S): 50 TABLET ORAL at 21:46

## 2018-02-06 RX ADMIN — OXYCODONE HYDROCHLORIDE 10 MILLIGRAM(S): 5 TABLET ORAL at 02:31

## 2018-02-06 RX ADMIN — HYDROMORPHONE HYDROCHLORIDE 0.5 MILLIGRAM(S): 2 INJECTION INTRAMUSCULAR; INTRAVENOUS; SUBCUTANEOUS at 06:07

## 2018-02-06 RX ADMIN — Medication 100 MILLIGRAM(S): at 14:26

## 2018-02-06 RX ADMIN — OXYCODONE HYDROCHLORIDE 10 MILLIGRAM(S): 5 TABLET ORAL at 02:41

## 2018-02-06 RX ADMIN — Medication 100 MILLIGRAM(S): at 21:46

## 2018-02-06 RX ADMIN — OXYCODONE HYDROCHLORIDE 10 MILLIGRAM(S): 5 TABLET ORAL at 10:47

## 2018-02-06 RX ADMIN — Medication 25 MILLIGRAM(S): at 21:46

## 2018-02-06 RX ADMIN — HYDROMORPHONE HYDROCHLORIDE 0.5 MILLIGRAM(S): 2 INJECTION INTRAMUSCULAR; INTRAVENOUS; SUBCUTANEOUS at 00:28

## 2018-02-06 RX ADMIN — FONDAPARINUX SODIUM 2.5 MILLIGRAM(S): 2.5 INJECTION, SOLUTION SUBCUTANEOUS at 12:23

## 2018-02-06 RX ADMIN — LATANOPROST 1 DROP(S): 0.05 SOLUTION/ DROPS OPHTHALMIC; TOPICAL at 21:46

## 2018-02-06 RX ADMIN — Medication 100 MILLIGRAM(S): at 02:33

## 2018-02-06 RX ADMIN — SODIUM CHLORIDE 857.14 MILLILITER(S): 9 INJECTION INTRAMUSCULAR; INTRAVENOUS; SUBCUTANEOUS at 06:27

## 2018-02-06 RX ADMIN — Medication 100 MILLIGRAM(S): at 06:23

## 2018-02-06 RX ADMIN — TRAMADOL HYDROCHLORIDE 50 MILLIGRAM(S): 50 TABLET ORAL at 14:27

## 2018-02-06 RX ADMIN — HYDROMORPHONE HYDROCHLORIDE 0.5 MILLIGRAM(S): 2 INJECTION INTRAMUSCULAR; INTRAVENOUS; SUBCUTANEOUS at 00:18

## 2018-02-06 RX ADMIN — TRAMADOL HYDROCHLORIDE 50 MILLIGRAM(S): 50 TABLET ORAL at 14:26

## 2018-02-06 RX ADMIN — POLYETHYLENE GLYCOL 3350 17 GRAM(S): 17 POWDER, FOR SOLUTION ORAL at 12:38

## 2018-02-06 RX ADMIN — ATORVASTATIN CALCIUM 20 MILLIGRAM(S): 80 TABLET, FILM COATED ORAL at 21:46

## 2018-02-06 NOTE — PHYSICAL THERAPY INITIAL EVALUATION ADULT - PERTINENT HX OF CURRENT PROBLEM, REHAB EVAL
52 yr old female with DM1, on insulin pump, mild AMBIKA, osteoarthritis of the right hip,  presents to PST for scheduled right total hip replacement on 2/5/18. Denies fever, chills, no acute complaints.

## 2018-02-06 NOTE — DISCHARGE NOTE ADULT - PLAN OF CARE
pain free ambulation DIET: resume diabetic diet regimen   DVT PROPHYLAXIS: continue ecotrin 325mg twice a day x 6 weeks total  WEIGHT-BEARING STATUS: weight bearing as tolerated right leg(posterior precautions); please continue exercises as described by physical therapy  BATHING: keep dressing clean and dry   DRESSING CHANGES: please do not remove aquacel dressing; have aquacel dressing/staples/sutures removed by your surgeon 10-14 days after surgery DIET: resume diabetic diet regimen   DVT PROPHYLAXIS: continue ecotrin 325mg twice a day x 6 weeks total  WEIGHT-BEARING STATUS: weight bearing as tolerated right leg(posterior precautions); please continue exercises as described by physical therapy  BATHING: keep dressing clean and dry   ice to affected incision every 4-6 hours x 72 hours   elevate affected extremity when @ rest   DRESSING CHANGES: please do not remove aquacel dressing; have aquacel dressing/staples/sutures removed by your surgeon 10-14 days after surgery

## 2018-02-06 NOTE — OCCUPATIONAL THERAPY INITIAL EVALUATION ADULT - MANUAL MUSCLE TESTING RESULTS, REHAB EVAL
bilateral UE/LE 3/5/grossly assessed due to grossly assessed due to/bilateral UE 3/5, LE 3-/5 due to R THR

## 2018-02-06 NOTE — PHYSICAL THERAPY INITIAL EVALUATION ADULT - ACTIVE RANGE OF MOTION EXAMINATION, REHAB EVAL
bilateral upper extremity Active ROM was WFL (within functional limits)/R hip precautions observed/bilateral  lower extremity Active ROM was WFL (within functional limits)

## 2018-02-06 NOTE — OCCUPATIONAL THERAPY INITIAL EVALUATION ADULT - PERTINENT HX OF CURRENT PROBLEM, REHAB EVAL
52 yr old F with DM1, on insulin pump, mild AMBIKA, osteoarthritis of the right hip,  presents to PST for scheduled right total hip replacement on 2/5/18.

## 2018-02-06 NOTE — PROGRESS NOTE ADULT - PROBLEM SELECTOR PLAN 1
- patient no longer on temporary basal rate, pump currently set at home settings and patient is tolerating po  - recommend temporary 110% basal rate given mild hyperglycemia, will monitor FS and adjust settings further as needed  - consistent carb diet   - will follow - patient no longer on temporary basal rate, pump currently set at home settings and patient is tolerating po  - recommend temporary 120% basal rate if patient with persistent hyperglycemia above 200, will monitor FS and adjust settings further as needed  - consistent carb diet   - will follow

## 2018-02-06 NOTE — DISCHARGE NOTE ADULT - CARE PROVIDER_API CALL
Cezar Marshall), Orthopaedic Surgery  [address] [address]  Phone: [phone]  Fax: [phone]

## 2018-02-06 NOTE — DISCHARGE NOTE ADULT - HOSPITAL COURSE
· Primary Care Provider	Dr. Stanley, -[phone]  18  · Care Providers for Follow up (PCP/Outpatient Provider)	endocrinologist Dr. Nidia Reynolds [phone] ( last seen 2018, has appointment next week ) [facility],  [phone] , last seen 2017    Chief Complaint/Reason for Visit/HPI:    Chief Complaint/Reason for Visit:  Chief Complaint/Reason for Admission	right hip pain     History of Present Illness:  History of Present Illness	  52 yr old female with DM1, on insulin pump, mild AMBIKA, osteoarthritis of the right hip,  presents to PST for scheduled right total hip replacement on 18. Denies fever, chills, no acute complaints.     Endocrinologist Dr. Anne Huston spoke with patient over the phone and discussed  preop instructions. As of today, the surgery tentative time is at afternoon, patient was instructed to change her pump setting to basal temporary rate at 80% the night prior procedure. Patient completed Self- Management Insulin pump documentation today. Patient being diabetic over 40 years, on Insulin pump for 30 years, also diabetic educator and dietitian. Reports most recent Hg A1c -7.5%.     Allergies/Medications:   Allergies:        Allergies:  	Bactrim: Drug, Hives    Home Medications:   * Patient Currently Takes Medications as of 2018 09:23 documented in Structured Notes  · 	NovoLOG: Last Dose Taken:  , insulin pump , basal rate 1.5 U /hr, TDD 18 61 Unit /day , average around 60 U/day.   Hg A1c 7.5 ( 2018)   · 	Crestor 5 mg oral tablet: Last Dose Taken:  , 1 tab(s) orally once a day (at bedtime)  · 	metoprolol succinate 25 mg oral tablet, extended release: Last Dose Taken:  , 1 tab(s) orally once a day  · 	latanoprost 0.005% ophthalmic solution: Last Dose Taken:  , 1 drop(s) to each affected eye once a day (in the evening)  · 	SymlinPen 120 subcutaneous solution: Last Dose Taken:  , 60 microgram(s) subcutaneous 3 times a day, before meals   · 	omeprazole 20 mg oral delayed release capsule: Last Dose Taken:  , 1 cap(s) orally once a day, As Needed  · 	Aleve 220 mg oral tablet: Last Dose Taken:  , 1 tab(s) orally once a day, As Needed  · 	Motrin 400 mg oral tablet: Last Dose Taken:  , 1 tab(s) orally once a day (at bedtime), As Needed  · 	multivitamin: Last Dose Taken:  , orally once a day  · 	Calcium 600+D oral tablet: Last Dose Taken:  , 1 tab(s) orally once a day  · 	Fish Oil oral capsule: Last Dose Taken:  , 1 tab(s) orally once a day  · 	vitamin E 100 intl units oral capsule: Last Dose Taken:  , 1 cap(s) orally once a day  · 	CoQ10 300 mg oral capsule: Last Dose Taken:  , 1 cap(s) orally once a day, As Needed    PMH/PSH/FH/SH:    Past Medical History:  Ear fullness, bilateral  chronic, slightly deminished hearing  Essential hypertension  Denies HTN, on preventative Metoprolol ( due to DM1)  GERD (gastroesophageal reflux disease)    Hyperlipidemia, unspecified hyperlipidemia type  Denies HLD, on preventative Crestor ( due to DM1)  Primary open angle glaucoma of both eyes, unspecified glaucoma stage    Primary osteoarthritis of right hip    Sleep apnea  mild, no CPAP  Type 1 diabetes mellitus without complication, with long-term current use of insulin  Insulin Pump for 30 years, [employer],  diagnosed 40 years ago.     Past Surgical History:  Elective surgery  Right ear tube placement, came out later , diminished hearing  H/O  section    S/P carpal tunnel release  Bilateral    S/P cataract surgery, left  and right 2017  S/P endometrial ablation  2016.    Hospital Course:  : Admitted to [facility]; underwent right total hip replacement; tolerated procedure well  - endocrinology was consulted for insulin pump management   : evaluated by physical therapy/occupational therapy who recommended: home  Pt stable for discharge on:  Discharge H/H: · Primary Care Provider	Dr. Stanley, [phone]  18  · Care Providers for Follow up (PCP/Outpatient Provider)	endocrinologist Dr. Nidia Reynolds [phone] ( last seen 2018, has appointment next week ) [facility],  [phone] , last seen 2017    Chief Complaint/Reason for Visit/HPI:    Chief Complaint/Reason for Visit:  Chief Complaint/Reason for Admission	right hip pain     History of Present Illness:  History of Present Illness	  52 yr old female with DM1, on insulin pump, mild AMBIKA, osteoarthritis of the right hip,  presents to PST for scheduled right total hip replacement on 18. Denies fever, chills, no acute complaints.     Endocrinologist Dr. Anne Huston spoke with patient over the phone and discussed  preop instructions. As of today, the surgery tentative time is at afternoon, patient was instructed to change her pump setting to basal temporary rate at 80% the night prior procedure. Patient completed Self- Management Insulin pump documentation today. Patient being diabetic over 40 years, on Insulin pump for 30 years, also diabetic educator and dietitian. Reports most recent Hg A1c -7.5%.     Allergies/Medications:   Allergies:        Allergies:  	Bactrim: Drug, Hives    Home Medications:   * Patient Currently Takes Medications as of 2018 09:23 documented in Structured Notes  · 	NovoLOG: Last Dose Taken:  , insulin pump , basal rate 1.5 U /hr, TDD 18 61 Unit /day , average around 60 U/day.   Hg A1c 7.5 ( 2018)   · 	Crestor 5 mg oral tablet: Last Dose Taken:  , 1 tab(s) orally once a day (at bedtime)  · 	metoprolol succinate 25 mg oral tablet, extended release: Last Dose Taken:  , 1 tab(s) orally once a day  · 	latanoprost 0.005% ophthalmic solution: Last Dose Taken:  , 1 drop(s) to each affected eye once a day (in the evening)  · 	SymlinPen 120 subcutaneous solution: Last Dose Taken:  , 60 microgram(s) subcutaneous 3 times a day, before meals   · 	omeprazole 20 mg oral delayed release capsule: Last Dose Taken:  , 1 cap(s) orally once a day, As Needed  · 	Aleve 220 mg oral tablet: Last Dose Taken:  , 1 tab(s) orally once a day, As Needed  · 	Motrin 400 mg oral tablet: Last Dose Taken:  , 1 tab(s) orally once a day (at bedtime), As Needed  · 	multivitamin: Last Dose Taken:  , orally once a day  · 	Calcium 600+D oral tablet: Last Dose Taken:  , 1 tab(s) orally once a day  · 	Fish Oil oral capsule: Last Dose Taken:  , 1 tab(s) orally once a day  · 	vitamin E 100 intl units oral capsule: Last Dose Taken:  , 1 cap(s) orally once a day  · 	CoQ10 300 mg oral capsule: Last Dose Taken:  , 1 cap(s) orally once a day, As Needed    PMH/PSH/FH/SH:    Past Medical History:  Ear fullness, bilateral  chronic, slightly deminished hearing  Essential hypertension  Denies HTN, on preventative Metoprolol ( due to DM1)  GERD (gastroesophageal reflux disease)    Hyperlipidemia, unspecified hyperlipidemia type  Denies HLD, on preventative Crestor ( due to DM1)  Primary open angle glaucoma of both eyes, unspecified glaucoma stage    Primary osteoarthritis of right hip    Sleep apnea  mild, no CPAP  Type 1 diabetes mellitus without complication, with long-term current use of insulin  Insulin Pump for 30 years, [employer]  diagnosed 40 years ago.     Past Surgical History:  Elective surgery  Right ear tube placement, came out later , diminished hearing  H/O  section    S/P carpal tunnel release  Bilateral    S/P cataract surgery, left  and right 2017  S/P endometrial ablation  2016.    Hospital Course:  : Admitted to [facility]; underwent right total hip replacement; tolerated procedure well  - endocrinology was consulted for insulin pump management   : evaluated by physical therapy/occupational therapy who recommended: home  - skinner catheter re-inserted for retention postop  Pt stable for discharge on:  Discharge H/H: · Primary Care Provider	Dr. Stanley, [phone]  18  · Care Providers for Follow up (PCP/Outpatient Provider)	endocrinologist Dr. Nidia Reynolds [phone] ( last seen 2018, has appointment next week ) [facility],  [phone] , last seen 2017    Chief Complaint/Reason for Visit/HPI:    Chief Complaint/Reason for Visit:  Chief Complaint/Reason for Admission	right hip pain     History of Present Illness:  History of Present Illness	  52 yr old female with DM1, on insulin pump, mild AMBIKA, osteoarthritis of the right hip,  presents to PST for scheduled right total hip replacement on 18. Denies fever, chills, no acute complaints.     Endocrinologist Dr. Anne Huston spoke with patient over the phone and discussed  preop instructions. As of today, the surgery tentative time is at afternoon, patient was instructed to change her pump setting to basal temporary rate at 80% the night prior procedure. Patient completed Self- Management Insulin pump documentation today. Patient being diabetic over 40 years, on Insulin pump for 30 years, also diabetic educator and dietitian. Reports most recent Hg A1c -7.5%.     Allergies/Medications:   Allergies:        Allergies:  	Bactrim: Drug, Hives    Home Medications:   * Patient Currently Takes Medications as of 2018 09:23 documented in Structured Notes  · 	NovoLOG: Last Dose Taken:  , insulin pump , basal rate 1.5 U /hr, TDD 18 61 Unit /day , average around 60 U/day.   Hg A1c 7.5 ( 2018)   · 	Crestor 5 mg oral tablet: Last Dose Taken:  , 1 tab(s) orally once a day (at bedtime)  · 	metoprolol succinate 25 mg oral tablet, extended release: Last Dose Taken:  , 1 tab(s) orally once a day  · 	latanoprost 0.005% ophthalmic solution: Last Dose Taken:  , 1 drop(s) to each affected eye once a day (in the evening)  · 	SymlinPen 120 subcutaneous solution: Last Dose Taken:  , 60 microgram(s) subcutaneous 3 times a day, before meals   · 	omeprazole 20 mg oral delayed release capsule: Last Dose Taken:  , 1 cap(s) orally once a day, As Needed  · 	Aleve 220 mg oral tablet: Last Dose Taken:  , 1 tab(s) orally once a day, As Needed  · 	Motrin 400 mg oral tablet: Last Dose Taken:  , 1 tab(s) orally once a day (at bedtime), As Needed  · 	multivitamin: Last Dose Taken:  , orally once a day  · 	Calcium 600+D oral tablet: Last Dose Taken:  , 1 tab(s) orally once a day  · 	Fish Oil oral capsule: Last Dose Taken:  , 1 tab(s) orally once a day  · 	vitamin E 100 intl units oral capsule: Last Dose Taken:  , 1 cap(s) orally once a day  · 	CoQ10 300 mg oral capsule: Last Dose Taken:  , 1 cap(s) orally once a day, As Needed    PMH/PSH/FH/SH:    Past Medical History:  Ear fullness, bilateral  chronic, slightly deminished hearing  Essential hypertension  Denies HTN, on preventative Metoprolol ( due to DM1)  GERD (gastroesophageal reflux disease)    Hyperlipidemia, unspecified hyperlipidemia type  Denies HLD, on preventative Crestor ( due to DM1)  Primary open angle glaucoma of both eyes, unspecified glaucoma stage    Primary osteoarthritis of right hip    Sleep apnea  mild, no CPAP  Type 1 diabetes mellitus without complication, with long-term current use of insulin  Insulin Pump for 30 years, [employer],  diagnosed 40 years ago.     Past Surgical History:  Elective surgery  Right ear tube placement, came out later , diminished hearing  H/O  section    S/P carpal tunnel release  Bilateral    S/P cataract surgery, left  and right 2017  S/P endometrial ablation  2016.    Hospital Course:  : Admitted to [facility]; underwent right total hip replacement; tolerated procedure well  - endocrinology was consulted for insulin pump management   : evaluated by physical therapy/occupational therapy who recommended: home  - skinner catheter re-inserted for retention postop  : Pt voided w/o difficulty  Pt stable for discharge on:   Discharge H/H: · Primary Care Provider	Dr. Stanley, [phone]  18  · Care Providers for Follow up (PCP/Outpatient Provider)	endocrinologist Dr. Nidia Reynolds [phone] ( last seen 2018, has appointment next week ) [facility],  [phone] , last seen 2017    Chief Complaint/Reason for Visit/HPI:    Chief Complaint/Reason for Visit:  Chief Complaint/Reason for Admission	right hip pain     History of Present Illness:  History of Present Illness	  52 yr old female with DM1, on insulin pump, mild AMBIKA, osteoarthritis of the right hip,  presents to PST for scheduled right total hip replacement on 18. Denies fever, chills, no acute complaints.     Endocrinologist Dr. Anne Huston spoke with patient over the phone and discussed  preop instructions. As of today, the surgery tentative time is at afternoon, patient was instructed to change her pump setting to basal temporary rate at 80% the night prior procedure. Patient completed Self- Management Insulin pump documentation today. Patient being diabetic over 40 years, on Insulin pump for 30 years, also diabetic educator and dietitian. Reports most recent Hg A1c -7.5%.     Allergies/Medications:   Allergies:        Allergies:  	Bactrim: Drug, Hives    Home Medications:   * Patient Currently Takes Medications as of 2018 09:23 documented in Structured Notes  · 	NovoLOG: Last Dose Taken:  , insulin pump , basal rate 1.5 U /hr, TDD 18 61 Unit /day , average around 60 U/day.   Hg A1c 7.5 ( 2018)   · 	Crestor 5 mg oral tablet: Last Dose Taken:  , 1 tab(s) orally once a day (at bedtime)  · 	metoprolol succinate 25 mg oral tablet, extended release: Last Dose Taken:  , 1 tab(s) orally once a day  · 	latanoprost 0.005% ophthalmic solution: Last Dose Taken:  , 1 drop(s) to each affected eye once a day (in the evening)  · 	SymlinPen 120 subcutaneous solution: Last Dose Taken:  , 60 microgram(s) subcutaneous 3 times a day, before meals   · 	omeprazole 20 mg oral delayed release capsule: Last Dose Taken:  , 1 cap(s) orally once a day, As Needed  · 	Aleve 220 mg oral tablet: Last Dose Taken:  , 1 tab(s) orally once a day, As Needed  · 	Motrin 400 mg oral tablet: Last Dose Taken:  , 1 tab(s) orally once a day (at bedtime), As Needed  · 	multivitamin: Last Dose Taken:  , orally once a day  · 	Calcium 600+D oral tablet: Last Dose Taken:  , 1 tab(s) orally once a day  · 	Fish Oil oral capsule: Last Dose Taken:  , 1 tab(s) orally once a day  · 	vitamin E 100 intl units oral capsule: Last Dose Taken:  , 1 cap(s) orally once a day  · 	CoQ10 300 mg oral capsule: Last Dose Taken:  , 1 cap(s) orally once a day, As Needed    PMH/PSH/FH/SH:    Past Medical History:  Ear fullness, bilateral  chronic, slightly deminished hearing  Essential hypertension  Denies HTN, on preventative Metoprolol ( due to DM1)  GERD (gastroesophageal reflux disease)    Hyperlipidemia, unspecified hyperlipidemia type  Denies HLD, on preventative Crestor ( due to DM1)  Primary open angle glaucoma of both eyes, unspecified glaucoma stage    Primary osteoarthritis of right hip    Sleep apnea  mild, no CPAP  Type 1 diabetes mellitus without complication, with long-term current use of insulin  Insulin Pump for 30 years, [employer],  diagnosed 40 years ago.     Past Surgical History:  Elective surgery  Right ear tube placement, came out later , diminished hearing  H/O  section    S/P carpal tunnel release  Bilateral    S/P cataract surgery, left  and right 2017  S/P endometrial ablation  2016.    Hospital Course:  : Admitted to [facility]; underwent right total hip replacement; tolerated procedure well  - endocrinology was consulted for insulin pump management   : evaluated by physical therapy/occupational therapy who recommended: home  - skinner catheter re-inserted for retention postop  : Pt voided w/o difficulty  Pt stable for discharge on:   Discharge H/H: 11.    Follow up Dr Marshall AND Endocrinologist as outpatient

## 2018-02-06 NOTE — PROGRESS NOTE ADULT - SUBJECTIVE AND OBJECTIVE BOX
Patient is a 52y old  Female who presents with a chief complaint of right hip pain (05 Feb 2018 08:18)      POST OPERATIVE DAY #:  1  Patient comfortable  No complaints    VS:  T(C): 36.9 (02-06-18 @ 04:38), Max: 36.9 (02-06-18 @ 04:38)  T(F): 98.4 (02-06-18 @ 04:38), Max: 98.4 (02-06-18 @ 04:38)  HR: 95 (02-06-18 @ 04:38) (74 - 95)  BP: 123/69 (02-06-18 @ 04:38) (95/55 - 124/70)  RR: 18 (02-06-18 @ 04:38) (16 - 20)  SpO2: 98% (02-06-18 @ 04:38) (98% - 100%)  Wt(kg): --      PHYSICAL EXAM:  NAD, Alert  EXT:   Rt Hip: Aquacel Dressing C/D/I  No Calf Tenderness  (+) DF/PF; (+) Distal Pulses;  Sensation: No gross deficits noted      B/L, PAS     LABS:                        12.1   14.2<H> )-----------( 220      ( 05 Feb 2018 15:44 )             34.0<L>    02-05    137  |  105  |  12  ----------------------------<  253<H>  4.4   |  28  |  0.58

## 2018-02-06 NOTE — DISCHARGE NOTE ADULT - REASON FOR ADMISSION
right hip pain right hip pain-Right Total Hip Arthroplasty right hip pain-  Right Total Hip Arthroplasty

## 2018-02-06 NOTE — DISCHARGE NOTE ADULT - MEDICATION SUMMARY - MEDICATIONS TO TAKE
I will START or STAY ON the medications listed below when I get home from the hospital:    Rolling Walker  -- RAE: 99 months    s/p Right THR  -- Indication: For adjunct    acetaminophen 325 mg oral tablet  -- 2 tab(s) by mouth every 6 hours, As needed, mild pain  -- Indication: For Pain    acetaminophen 325 mg oral tablet  -- 2 tab(s) by mouth every 6 hours, As needed, For Temp over 38.3 C (100.94 F)  -- Indication: For fever    oxyCODONE 5 mg oral tablet  -- 1-2 tab(s) by mouth every 6 hours, As Needed MDD:8  -- Indication: For Pain    meloxicam 7.5 mg oral tablet  -- 1 tab(s) by mouth once a day   -- Do not take this drug if you are pregnant.  Obtain medical advice before taking any non-prescription drugs as some may affect the action of this medication.  Take with food or milk.    -- Indication: For Pain control    traMADol 50 mg oral tablet  -- 1 tab(s) by mouth every 8 hours x ONE week then STOP MDD:3  -- Indication: For Pain control    SymlinPen 120 subcutaneous solution  -- 60 microgram(s) subcutaneous 3 times a day, before meals   -- Indication: For DM    NovoLOG  -- insulin pump , basal rate 1.5 U /hr, TDD 1/21/18 61 Unit /day , average around 60 U/day.   Hg A1c 7.5 ( 1/2018)   -- Indication: For DM    Crestor 5 mg oral tablet  -- 1 tab(s) by mouth once a day (at bedtime)  -- Indication: For Hyperlipidemia, unspecified hyperlipidemia type    metoprolol succinate 25 mg oral tablet, extended release  -- 1 tab(s) by mouth once a day (at bedtime)  -- Indication: For Essential hypertension    docusate sodium 100 mg oral capsule  -- 1 cap(s) by mouth 3 times a day  Purchase over-the-counter Colace 100mg and continue to take three times-a-day to prevent constipation while on pain meds.    -- Indication: For Stool softener    polyethylene glycol 3350 oral powder for reconstitution  -- 17 gram(s) by mouth once a day  while on pain medications   -- Indication: For laxative    senna oral tablet  -- 2 tab(s) by mouth once a day (at bedtime), As needed, Constipation  -- Indication: For laxative    latanoprost 0.005% ophthalmic solution  -- 1 drop(s) to each affected eye once a day (in the evening)  -- Indication: For Eye dropps    omeprazole 20 mg oral delayed release capsule  -- 1 cap(s) by mouth once a day, As Needed  -- Indication: For reflux    multivitamin  -- orally once a day  -- Indication: For Supplement I will START or STAY ON the medications listed below when I get home from the hospital:    Rolling Walker  -- RAE: 99 months    s/p Right THR  -- Indication: For adjunct    acetaminophen 325 mg oral tablet  -- 2 tab(s) by mouth every 6 hours, As needed, mild pain  -- Indication: For Pain    acetaminophen 325 mg oral tablet  -- 2 tab(s) by mouth every 6 hours, As needed, For Temp over 38.3 C (100.94 F)  -- Indication: For fever    oxyCODONE 5 mg oral tablet  -- 1-2 tab(s) by mouth every 6 hours, As Needed MDD:8  -- Indication: For Pain    meloxicam 7.5 mg oral tablet  -- 1 tab(s) by mouth once a day   -- Do not take this drug if you are pregnant.  Obtain medical advice before taking any non-prescription drugs as some may affect the action of this medication.  Take with food or milk.    -- Indication: For Pain control    Ecotrin 325 mg oral delayed release tablet  -- 1 tab(s) by mouth 2 times a day x 6 WEEKS Total (blood thinner) then STOP   -- Indication: For Blood THINNER    traMADol 50 mg oral tablet  -- 1 tab(s) by mouth every 8 hours x ONE week then STOP MDD:3  -- Indication: For Pain control    SymlinPen 120 subcutaneous solution  -- 60 microgram(s) subcutaneous 3 times a day, before meals   -- Indication: For DM    NovoLOG  -- insulin pump , basal rate 1.5 U /hr, TDD 1/21/18 61 Unit /day , average around 60 U/day.   Hg A1c 7.5 ( 1/2018)   -- Indication: For DM    Crestor 5 mg oral tablet  -- 1 tab(s) by mouth once a day (at bedtime)  -- Indication: For Hyperlipidemia, unspecified hyperlipidemia type    metoprolol succinate 25 mg oral tablet, extended release  -- 1 tab(s) by mouth once a day (at bedtime)  -- Indication: For Essential hypertension    docusate sodium 100 mg oral capsule  -- 1 cap(s) by mouth 3 times a day  Purchase over-the-counter Colace 100mg and continue to take three times-a-day to prevent constipation while on pain meds.    -- Indication: For Stool softener    polyethylene glycol 3350 oral powder for reconstitution  -- 17 gram(s) by mouth once a day  while on pain medications   -- Indication: For laxative    senna oral tablet  -- 2 tab(s) by mouth once a day (at bedtime), As needed, Constipation  -- Indication: For laxative    latanoprost 0.005% ophthalmic solution  -- 1 drop(s) to each affected eye once a day (in the evening)  -- Indication: For Eye dropps    omeprazole 20 mg oral delayed release capsule  -- 1 cap(s) by mouth once a day, As Needed  -- Indication: For reflux    multivitamin  -- orally once a day  -- Indication: For Supplement

## 2018-02-06 NOTE — DISCHARGE NOTE ADULT - PATIENT PORTAL LINK FT
You can access the [facility] Patient Portal, offered by [facility], by registering with the following website: [email or web address]

## 2018-02-06 NOTE — DISCHARGE NOTE ADULT - NSFTFSERV1RD_GEN_ALL_CORE
teaching and training/observation and assessment/medication teaching and assessment/rehabilitation services

## 2018-02-06 NOTE — DISCHARGE NOTE ADULT - NSFTFAWEIGHTRD_GEN_ALL_CORE
From: Zbigniew Began  To: Rodolfo Blanco NP  Sent: 7/25/2017 11:07 AM CDT  Subject: Visit Follow-up Question    Mone Barnett,  Just returned home from my visit with you and find a message on My Chart that I need to schedule a pneumonia vaccina Yes

## 2018-02-06 NOTE — PHYSICAL THERAPY INITIAL EVALUATION ADULT - GAIT DEVIATIONS NOTED, PT EVAL
decreased jarvis/decreased step length/decreased velocity of limb motion/decreased weight-shifting ability/increased time in double stance

## 2018-02-06 NOTE — ADVANCED PRACTICE NURSE CONSULT - ASSESSMENT
53 yo female with T1DM x 45 years with cataracts and glaucoma s/p laser and surgery here for R hip replacement. For the past couple of months she has been having increased pain and limited mobility in putting on her socks and shoes. No history of trauma to her R hip or falls.  Pt has been using a CSII for the past 30 years. She last saw her endo Jan 2018 and was told that her HbA1c was 7.7% and her basal rates and ICR were adjusted. She will f/u again with Endocrinologist in 3 months. Pt currently doing a temp basal of 80% due to N/V and decreased appetite since yesterday.  Pt has been correcting for highs but is hesitant to do so due to fear of lows since she is not eating much.  Pt is a Certified Diabetes Educator at [employer]  Pt has 2 sets of insulin pump supplies and insulin pump forms all completed and in chart.  Insulin pump site changed on 2/4/17 and due to be changed 2/6/18 or 2/7/18.  Pt also uses Enlite sensor which is on R abdomen and remains clean, dry, intact.  Pt changed her sensor site this Sunday 2/4/18.  Insulin pump site on L abdomen remains clean, dry, intact.  Pt followed by Endocrinologist Dr. Nidia Jolly and previously followed by Dr. Blackburn ([facility]). Pt’s current insulin pump rates as follows:     Basal  12am – 1.4 units/hour  330am - 1.45 units/hour  630am 1.25 units/hour  9am 1.5 units/hour  1130am 1.3 units/hour  Total daily basal – 32.15 units    ICR  12am - 1:5  1130am – 1:6    ISF  12am-12am- 60    BGT: 100-150 mg/dl    Insulin duration: 4 hours    Last site change: Saturday 2/4/18  Next site change: Tuesday 2/7/18  Insulin used: Novolog

## 2018-02-06 NOTE — DISCHARGE NOTE ADULT - MEDICATION SUMMARY - MEDICATIONS TO STOP TAKING
I will STOP taking the medications listed below when I get home from the hospital:    Aleve 220 mg oral tablet  -- 1 tab(s) by mouth once a day, As Needed    Motrin 400 mg oral tablet  -- 1 tab(s) by mouth once a day (at bedtime), As Needed    Fish Oil oral capsule  -- 1 tab(s) by mouth once a day    mupirocin 2% nasal ointment  -- 1 application intranasally 2 times a day   start 5 days prior to surgery  apply ointment via Q-tip to both nostrils 2x/day x 5 days.  -- For the nose.

## 2018-02-06 NOTE — PROGRESS NOTE ADULT - ATTENDING COMMENTS
Patient seen. Agree with above.  Had skinner placed.  NV intact,.  Dressing clean and dry.  Calf soft. NV intact.  Stable.  PT.  DVT prophylaxis.  OOB, D/C planning.  TOV.
Pt seen and examined. Agree with note as above with addendum: check bs around 6pm - an hour after site change and at 2am. Patient aware that her sensor readings may not be accurate as she took tylenol yesterday.

## 2018-02-06 NOTE — DISCHARGE NOTE ADULT - ADDITIONAL INSTRUCTIONS
- Follow up with Dr. Marshall in 10-14 days after surgery; call office for appointment upon discharge  - - Please have staples/sutures removed by physician 10-14 days after surgery if applicable  - - please follow up with your primary care doctor after discharge from hospital to discuss your hospital stay and any changes to you medications -- Continue ALL HOME Diabetic Rx   --- Follow up with Dr. Marshall in 10-14 days after surgery; call office for appointment upon discharge  - - Please have staples/sutures removed by physician 10-14 days after surgery if applicable  - - please follow up with your primary care doctor (Or endocrinologist) after discharge from hospital to discuss your hospital stay and any changes to you medications -- Continue ALL HOME Diabetic Rx   --Start ECOTRIN 325 mg by mouth 2x / day (at breakfast and at dinner) for a total of 6WEEKS   --- Follow up with Dr. Marshall in 10-14 days after surgery; call office for appointment upon discharge  - - Please have staples/sutures removed by physician 10-14 days after surgery if applicable  - - please follow up with your primary care doctor (Or endocrinologist) after discharge from hospital to discuss your hospital stay and any changes to you medications

## 2018-02-06 NOTE — PHYSICAL THERAPY INITIAL EVALUATION ADULT - GENERAL OBSERVATIONS, REHAB EVAL
Received in bed, +skinner, +dressing in R hip, +hip abduction wedge, +IVL, +insulin pump, no complaints, agreeable to PT

## 2018-02-06 NOTE — DISCHARGE NOTE ADULT - NS AS ACTIVITY OBS
No Heavy lifting/straining/Walking-Indoors allowed/Do not make important decisions/Walking-Outdoors allowed/Stairs allowed/Do not drive or operate machinery No Heavy lifting/straining/May shower; protect Aquacel dressing with water-tight seal  i.e. saran wrap; pat dry/Do not make important decisions/Walking-Indoors allowed/Stairs allowed/Walking-Outdoors allowed/Do not drive or operate machinery

## 2018-02-06 NOTE — PROGRESS NOTE ADULT - SUBJECTIVE AND OBJECTIVE BOX
Chief Complaint: f/u DM1    History:  Patient reports some nausea from opiates. Tolerating po diet, however states she has nausea when drinking any liquid that is not water. No abdominal pain, vomiting, diarrhea. Patient has her sensor on, sensor readings currently in 170's.     MEDICATIONS  (STANDING):  atorvastatin 20 milliGRAM(s) Oral at bedtime  calcium carbonate 1250 mG + Vitamin D (OsCal 500 + D) 1 Tablet(s) Oral daily  ceFAZolin   IVPB 2000 milliGRAM(s) IV Intermittent once  dextrose 5%. 1000 milliLiter(s) (50 mL/Hr) IV Continuous <Continuous>  dextrose 50% Injectable 12.5 Gram(s) IV Push once  dextrose 50% Injectable 25 Gram(s) IV Push once  dextrose 50% Injectable 25 Gram(s) IV Push once  docusate sodium 100 milliGRAM(s) Oral three times a day  fondaparinux Injectable 2.5 milliGRAM(s) SubCutaneous daily  insulin aspart (NovoLOG) Pump 1 Each SubCutaneous Continuous Pump  ketorolac   Injectable 15 milliGRAM(s) IV Push once  latanoprost 0.005% Ophthalmic Solution 1 Drop(s) Both EYES at bedtime  lidocaine 1% Injectable 0.2 milliLiter(s) Local Injection once  metoprolol succinate ER 25 milliGRAM(s) Oral daily  polyethylene glycol 3350 17 Gram(s) Oral daily  sodium chloride 0.9%. 1000 milliLiter(s) (80 mL/Hr) IV Continuous <Continuous>  traMADol 50 milliGRAM(s) Oral every 8 hours    MEDICATIONS  (PRN):  acetaminophen   Tablet 650 milliGRAM(s) Oral every 6 hours PRN For Temp over 38.3 C (100.94 F)  acetaminophen   Tablet 650 milliGRAM(s) Oral every 6 hours PRN mild pain  aluminum hydroxide/magnesium hydroxide/simethicone Suspension 30 milliLiter(s) Oral four times a day PRN Indigestion  dextrose Gel 1 Dose(s) Oral once PRN Blood Glucose LESS THAN 70 milliGRAM(s)/deciliter  glucagon  Injectable 1 milliGRAM(s) IntraMuscular once PRN Glucose LESS THAN 70 milligrams/deciliter  HYDROmorphone  Injectable 0.5 milliGRAM(s) IV Push every 4 hours PRN breakthrough pain  magnesium hydroxide Suspension 30 milliLiter(s) Oral daily PRN Constipation  ondansetron Injectable 4 milliGRAM(s) IV Push every 6 hours PRN Nausea and/or Vomiting  oxyCODONE    IR 5 milliGRAM(s) Oral every 4 hours PRN Moderate Pain (4 - 6)  oxyCODONE    IR 10 milliGRAM(s) Oral every 4 hours PRN Severe Pain (7 - 10)  senna 2 Tablet(s) Oral at bedtime PRN Constipation      Allergies  Bactrim (Hives)          Review of Systems:  Constitutional: No fever  Cardiovascular: No chest pain, palpitations  Respiratory: No SOB, no cough  GI: + nausea, no vomiting, no abdominal pain  Endocrine: no polyuria, polydipsia      ALL OTHER SYSTEMS REVIEWED AND NEGATIVE      PHYSICAL EXAM:  VITALS: T(C): 36.3 (02-06-18 @ 13:18)  T(F): 97.3 (02-06-18 @ 13:18), Max: 98.4 (02-06-18 @ 04:38)  HR: 84 (02-06-18 @ 13:18) (74 - 102)  BP: 125/73 (02-06-18 @ 13:18) (95/55 - 125/73)  RR:  (16 - 19)  SpO2:  (97% - 100%)  Wt(kg): --  GENERAL: NAD, well-developed  EYES: No proptosis, anicteric  HEENT:  Atraumatic, Normocephalic  RESPIRATORY: Clear to auscultation bilaterally; No rales, rhonchi, wheezing, or rubs  CARDIOVASCULAR: Regular rate and rhythm; No murmurs  GI: Soft, nontender, non distended, normal bowel sounds  PSYCH: Alert and oriented x 3, reactive affect      POCT Blood Glucose.: 211 mg/dL (02-06-18 @ 11:50)  POCT Blood Glucose.: 203 mg/dL (02-06-18 @ 07:24)  POCT Blood Glucose.: 148 mg/dL (02-05-18 @ 21:52)  POCT Blood Glucose.: 169 mg/dL (02-05-18 @ 19:39)  POCT Blood Glucose.: 245 mg/dL (02-05-18 @ 15:18)  POCT Blood Glucose.: 253 mg/dL (02-05-18 @ 08:08)      02-06    139  |  99  |  10  ----------------------------<  199<H>  3.6   |  26  |  0.72    EGFR if : 110  EGFR if non : 95    Ca    8.4      02-06            Thyroid Function Tests:      Hemoglobin A1C, Whole Blood: 7.9 % <H> [4.0 - 5.6] (01-22-18 @ 12:49) Chief Complaint: f/u DM1    History:  Patient reports some nausea from opiates. Tolerating po diet, however states she has nausea when drinking any liquid that is not water. No abdominal pain, vomiting, diarrhea. Patient has her sensor on, sensor readings currently in 170's.     MEDICATIONS  (STANDING):  atorvastatin 20 milliGRAM(s) Oral at bedtime  calcium carbonate 1250 mG + Vitamin D (OsCal 500 + D) 1 Tablet(s) Oral daily  ceFAZolin   IVPB 2000 milliGRAM(s) IV Intermittent once  dextrose 5%. 1000 milliLiter(s) (50 mL/Hr) IV Continuous <Continuous>  dextrose 50% Injectable 12.5 Gram(s) IV Push once  dextrose 50% Injectable 25 Gram(s) IV Push once  dextrose 50% Injectable 25 Gram(s) IV Push once  docusate sodium 100 milliGRAM(s) Oral three times a day  fondaparinux Injectable 2.5 milliGRAM(s) SubCutaneous daily  insulin aspart (NovoLOG) Pump 1 Each SubCutaneous Continuous Pump  ketorolac   Injectable 15 milliGRAM(s) IV Push once  latanoprost 0.005% Ophthalmic Solution 1 Drop(s) Both EYES at bedtime  lidocaine 1% Injectable 0.2 milliLiter(s) Local Injection once  metoprolol succinate ER 25 milliGRAM(s) Oral daily  polyethylene glycol 3350 17 Gram(s) Oral daily  sodium chloride 0.9%. 1000 milliLiter(s) (80 mL/Hr) IV Continuous <Continuous>  traMADol 50 milliGRAM(s) Oral every 8 hours    MEDICATIONS  (PRN):  acetaminophen   Tablet 650 milliGRAM(s) Oral every 6 hours PRN For Temp over 38.3 C (100.94 F)  acetaminophen   Tablet 650 milliGRAM(s) Oral every 6 hours PRN mild pain  aluminum hydroxide/magnesium hydroxide/simethicone Suspension 30 milliLiter(s) Oral four times a day PRN Indigestion  dextrose Gel 1 Dose(s) Oral once PRN Blood Glucose LESS THAN 70 milliGRAM(s)/deciliter  glucagon  Injectable 1 milliGRAM(s) IntraMuscular once PRN Glucose LESS THAN 70 milligrams/deciliter  HYDROmorphone  Injectable 0.5 milliGRAM(s) IV Push every 4 hours PRN breakthrough pain  magnesium hydroxide Suspension 30 milliLiter(s) Oral daily PRN Constipation  ondansetron Injectable 4 milliGRAM(s) IV Push every 6 hours PRN Nausea and/or Vomiting  oxyCODONE    IR 5 milliGRAM(s) Oral every 4 hours PRN Moderate Pain (4 - 6)  oxyCODONE    IR 10 milliGRAM(s) Oral every 4 hours PRN Severe Pain (7 - 10)  senna 2 Tablet(s) Oral at bedtime PRN Constipation      Allergies  Bactrim (Hives)          Review of Systems:  Constitutional: No fever  Cardiovascular: No chest pain, palpitations  Respiratory: No SOB, no cough  GI: + nausea, no vomiting, no abdominal pain  Endocrine: no polyuria, polydipsia      ALL OTHER SYSTEMS REVIEWED AND NEGATIVE      PHYSICAL EXAM:  VITALS: T(C): 36.3 (02-06-18 @ 13:18)  T(F): 97.3 (02-06-18 @ 13:18), Max: 98.4 (02-06-18 @ 04:38)  HR: 84 (02-06-18 @ 13:18) (74 - 102)  BP: 125/73 (02-06-18 @ 13:18) (95/55 - 125/73)  RR:  (16 - 19)  SpO2:  (97% - 100%)  Wt(kg): --  GENERAL: NAD, well-developed  EYES: No proptosis, anicteric  HEENT:  Atraumatic, Normocephalic  RESPIRATORY: Clear to auscultation bilaterally; No rales, rhonchi, wheezing, or rubs  CARDIOVASCULAR: Regular rate and rhythm; No murmurs  GI: Soft, nontender, non distended, normal bowel sounds  SKIN: +catheter in place in mid-lower quadrant, no erythema/exudate       POCT Blood Glucose.: 211 mg/dL (02-06-18 @ 11:50)  POCT Blood Glucose.: 203 mg/dL (02-06-18 @ 07:24)  POCT Blood Glucose.: 148 mg/dL (02-05-18 @ 21:52)  POCT Blood Glucose.: 169 mg/dL (02-05-18 @ 19:39)  POCT Blood Glucose.: 245 mg/dL (02-05-18 @ 15:18)  POCT Blood Glucose.: 253 mg/dL (02-05-18 @ 08:08)      02-06    139  |  99  |  10  ----------------------------<  199<H>  3.6   |  26  |  0.72    EGFR if : 110  EGFR if non : 95    Ca    8.4      02-06            Hemoglobin A1C, Whole Blood: 7.9 % <H> [4.0 - 5.6] (01-22-18 @ 12:49)

## 2018-02-07 VITALS
SYSTOLIC BLOOD PRESSURE: 114 MMHG | TEMPERATURE: 98 F | RESPIRATION RATE: 18 BRPM | OXYGEN SATURATION: 99 % | DIASTOLIC BLOOD PRESSURE: 68 MMHG | HEART RATE: 100 BPM

## 2018-02-07 DIAGNOSIS — E10.65 TYPE 1 DIABETES MELLITUS WITH HYPERGLYCEMIA: ICD-10-CM

## 2018-02-07 LAB
GLUCOSE BLDC GLUCOMTR-MCNC: 117 MG/DL — HIGH (ref 70–99)
GLUCOSE BLDC GLUCOMTR-MCNC: 166 MG/DL — HIGH (ref 70–99)
GLUCOSE BLDC GLUCOMTR-MCNC: 179 MG/DL — HIGH (ref 70–99)
GLUCOSE BLDC GLUCOMTR-MCNC: 285 MG/DL — HIGH (ref 70–99)
GLUCOSE BLDC GLUCOMTR-MCNC: 311 MG/DL — HIGH (ref 70–99)
GLUCOSE BLDC GLUCOMTR-MCNC: 97 MG/DL — SIGNIFICANT CHANGE UP (ref 70–99)

## 2018-02-07 PROCEDURE — 99233 SBSQ HOSP IP/OBS HIGH 50: CPT

## 2018-02-07 RX ORDER — DOCUSATE SODIUM 100 MG
1 CAPSULE ORAL
Qty: 0 | Refills: 0 | COMMUNITY
Start: 2018-02-07

## 2018-02-07 RX ORDER — ACETAMINOPHEN 500 MG
2 TABLET ORAL
Qty: 0 | Refills: 0 | COMMUNITY
Start: 2018-02-07

## 2018-02-07 RX ORDER — IBUPROFEN 200 MG
1 TABLET ORAL
Qty: 0 | Refills: 0 | COMMUNITY

## 2018-02-07 RX ORDER — SENNA PLUS 8.6 MG/1
2 TABLET ORAL
Qty: 0 | Refills: 0 | COMMUNITY
Start: 2018-02-07

## 2018-02-07 RX ORDER — INSULIN ASPART 100 [IU]/ML
1 INJECTION, SOLUTION SUBCUTANEOUS
Qty: 0 | Refills: 0 | Status: DISCONTINUED | OUTPATIENT
Start: 2018-02-07 | End: 2018-02-08

## 2018-02-07 RX ADMIN — POLYETHYLENE GLYCOL 3350 17 GRAM(S): 17 POWDER, FOR SOLUTION ORAL at 11:12

## 2018-02-07 RX ADMIN — Medication 100 MILLIGRAM(S): at 22:19

## 2018-02-07 RX ADMIN — TRAMADOL HYDROCHLORIDE 50 MILLIGRAM(S): 50 TABLET ORAL at 22:40

## 2018-02-07 RX ADMIN — Medication 1 TABLET(S): at 11:12

## 2018-02-07 RX ADMIN — TRAMADOL HYDROCHLORIDE 50 MILLIGRAM(S): 50 TABLET ORAL at 06:16

## 2018-02-07 RX ADMIN — OXYCODONE HYDROCHLORIDE 10 MILLIGRAM(S): 5 TABLET ORAL at 11:42

## 2018-02-07 RX ADMIN — OXYCODONE HYDROCHLORIDE 10 MILLIGRAM(S): 5 TABLET ORAL at 11:12

## 2018-02-07 RX ADMIN — TRAMADOL HYDROCHLORIDE 50 MILLIGRAM(S): 50 TABLET ORAL at 05:51

## 2018-02-07 RX ADMIN — ATORVASTATIN CALCIUM 20 MILLIGRAM(S): 80 TABLET, FILM COATED ORAL at 22:19

## 2018-02-07 RX ADMIN — FONDAPARINUX SODIUM 2.5 MILLIGRAM(S): 2.5 INJECTION, SOLUTION SUBCUTANEOUS at 11:12

## 2018-02-07 RX ADMIN — TRAMADOL HYDROCHLORIDE 50 MILLIGRAM(S): 50 TABLET ORAL at 14:30

## 2018-02-07 RX ADMIN — SENNA PLUS 2 TABLET(S): 8.6 TABLET ORAL at 22:19

## 2018-02-07 RX ADMIN — LATANOPROST 1 DROP(S): 0.05 SOLUTION/ DROPS OPHTHALMIC; TOPICAL at 22:20

## 2018-02-07 RX ADMIN — Medication 100 MILLIGRAM(S): at 11:12

## 2018-02-07 RX ADMIN — TRAMADOL HYDROCHLORIDE 50 MILLIGRAM(S): 50 TABLET ORAL at 13:26

## 2018-02-07 RX ADMIN — Medication 25 MILLIGRAM(S): at 22:19

## 2018-02-07 RX ADMIN — TRAMADOL HYDROCHLORIDE 50 MILLIGRAM(S): 50 TABLET ORAL at 22:19

## 2018-02-07 RX ADMIN — Medication 100 MILLIGRAM(S): at 05:51

## 2018-02-07 NOTE — PROGRESS NOTE ADULT - SUBJECTIVE AND OBJECTIVE BOX
Diabetes Follow up note:  Interval Hx:    glycemic control     Review of Systems:  General:  GI: Tolerating POs without any N/V/D/ABD PAIN.  CV: No CP/SOB  ENDO: No S&Sx of hypoglycemia  MEDS:  atorvastatin 20 milliGRAM(s) Oral at bedtime  dextrose 50% Injectable 12.5 Gram(s) IV Push once  dextrose 50% Injectable 25 Gram(s) IV Push once  dextrose 50% Injectable 25 Gram(s) IV Push once  dextrose Gel 1 Dose(s) Oral once PRN  glucagon  Injectable 1 milliGRAM(s) IntraMuscular once PRN  insulin aspart (NovoLOG) Pump 1 Each SubCutaneous Continuous Pump    ceFAZolin   IVPB 2000 milliGRAM(s) IV Intermittent once    Allergies    Bactrim (Hives)    Intolerances      PE:  General:  Vital Signs Last 24 Hrs  T(C): 36.9 (07 Feb 2018 11:14), Max: 37.2 (07 Feb 2018 05:28)  T(F): 98.4 (07 Feb 2018 11:14), Max: 98.9 (07 Feb 2018 05:28)  HR: 99 (07 Feb 2018 11:14) (84 - 104)  BP: 121/68 (07 Feb 2018 11:14) (108/66 - 125/73)  BP(mean): --  RR: 18 (07 Feb 2018 11:14) (18 - 18)  SpO2: 100% (07 Feb 2018 11:14) (97% - 100%)  Abd: Soft, NT,ND,   Extremities: Warm  Neuro: A&O X3    LABS:    POCT Blood Glucose.: 179 mg/dL (02-07-18 @ 07:37)  POCT Blood Glucose.: 285 mg/dL (02-07-18 @ 02:57)  POCT Blood Glucose.: 311 mg/dL (02-07-18 @ 01:56)  POCT Blood Glucose.: 232 mg/dL (02-06-18 @ 21:38)  POCT Blood Glucose.: 296 mg/dL (02-06-18 @ 19:01)  POCT Blood Glucose.: 264 mg/dL (02-06-18 @ 18:00)  POCT Blood Glucose.: 252 mg/dL (02-06-18 @ 16:55)  POCT Blood Glucose.: 211 mg/dL (02-06-18 @ 11:50)  POCT Blood Glucose.: 203 mg/dL (02-06-18 @ 07:24)  POCT Blood Glucose.: 148 mg/dL (02-05-18 @ 21:52)  POCT Blood Glucose.: 169 mg/dL (02-05-18 @ 19:39)  POCT Blood Glucose.: 245 mg/dL (02-05-18 @ 15:18)  POCT Blood Glucose.: 253 mg/dL (02-05-18 @ 08:08)                            10.6   16.9  )-----------( 252      ( 07 Feb 2018 10:57 )             30.0       02-07    134<L>  |  96  |  9   ----------------------------<  204<H>  4.1   |  25  |  0.56    Ca    8.8      07 Feb 2018 10:57        Thyroid Function Tests:      Hemoglobin A1C, Whole Blood: 7.9 % <H> [4.0 - 5.6] (01-22-18 @ 12:49)            Contact number: jono [phone] or [phone] Diabetes Follow up note:  Interval Hx:  52 year old female w/uncontrolled T1DM (managed w/insulin pump) w/cataracts/glaucoma s/p R hip replacement. BG values elevated yesterday 200's persistently but down to 179 this AM. Pt seen at bedside. Nausea/PO intake improved today. Pt for discharge home likely tmw per ortho team. Pt reports changing pump site yesterday and has been on temp basal of 133%. Attributes higher BG values to being off home Symlin. Also reports, sensor not working at this time (likely 2/2 Tylenol use)    Review of Systems:  General: "pain is a little better"  GI: Tolerating POs without any N/V/D/ABD PAIN.  CV: No CP/SOB  ENDO: No S&Sx of hypoglycemia  MEDS:  atorvastatin 20 milliGRAM(s) Oral at bedtime  dextrose 50% Injectable 12.5 Gram(s) IV Push once  dextrose 50% Injectable 25 Gram(s) IV Push once  dextrose 50% Injectable 25 Gram(s) IV Push once  dextrose Gel 1 Dose(s) Oral once PRN  glucagon  Injectable 1 milliGRAM(s) IntraMuscular once PRN  insulin aspart (NovoLOG) Pump 1 Each SubCutaneous Continuous Pump  Pump settings:  basal: 12am: 1.4  0330: 1.45  0630: 1.25  0900: 1.5  1130-1.3  I:C Ratio  12am- 1:5  1130am: 1:6  ISF:  60  BG Target:  100-150mg/dl      ceFAZolin   IVPB 2000 milliGRAM(s) IV Intermittent once    Allergies    Bactrim (Hives)          PE:  General: Female sitting in chair. NAD.   Vital Signs Last 24 Hrs  T(C): 36.9 (07 Feb 2018 11:14), Max: 37.2 (07 Feb 2018 05:28)  T(F): 98.4 (07 Feb 2018 11:14), Max: 98.9 (07 Feb 2018 05:28)  HR: 99 (07 Feb 2018 11:14) (84 - 104)  BP: 121/68 (07 Feb 2018 11:14) (108/66 - 125/73)  BP(mean): --  RR: 18 (07 Feb 2018 11:14) (18 - 18)  SpO2: 100% (07 Feb 2018 11:14) (97% - 100%)  Abd: Soft, NT,ND, Pump site c/d/i  Extremities: Warm. R hip dsg c/d/i  Neuro: A&O X3    LABS:    POCT Blood Glucose.: 179 mg/dL (02-07-18 @ 07:37)  POCT Blood Glucose.: 285 mg/dL (02-07-18 @ 02:57)  POCT Blood Glucose.: 311 mg/dL (02-07-18 @ 01:56)  POCT Blood Glucose.: 232 mg/dL (02-06-18 @ 21:38)  POCT Blood Glucose.: 296 mg/dL (02-06-18 @ 19:01)  POCT Blood Glucose.: 264 mg/dL (02-06-18 @ 18:00)  POCT Blood Glucose.: 252 mg/dL (02-06-18 @ 16:55)  POCT Blood Glucose.: 211 mg/dL (02-06-18 @ 11:50)  POCT Blood Glucose.: 203 mg/dL (02-06-18 @ 07:24)  POCT Blood Glucose.: 148 mg/dL (02-05-18 @ 21:52)  POCT Blood Glucose.: 169 mg/dL (02-05-18 @ 19:39)  POCT Blood Glucose.: 245 mg/dL (02-05-18 @ 15:18)  POCT Blood Glucose.: 253 mg/dL (02-05-18 @ 08:08)                            10.6   16.9  )-----------( 252      ( 07 Feb 2018 10:57 )             30.0       02-07    134<L>  |  96  |  9   ----------------------------<  204<H>  4.1   |  25  |  0.56    Ca    8.8      07 Feb 2018 10:57            Hemoglobin A1C, Whole Blood: 7.9 % <H> [4.0 - 5.6] (01-22-18 @ 12:49)            Contact number: jono [phone] or [phone]

## 2018-02-07 NOTE — PROVIDER CONTACT NOTE (OTHER) - ASSESSMENT
Patient denies discomfort. Patient currently sitting in chair.
pt resting comfortably in bed, no signs or symptoms of distress noted. Pt did not have anything to eat since dinner time. Pt gave herself 7 units.
bilateral lower extremity Active ROM was WNL (within normal limits)/glenn. upper extremity Active ROM was WNL (within normal limits)

## 2018-02-07 NOTE — PROVIDER CONTACT NOTE (OTHER) - ACTION/TREATMENT ORDERED:
As per MD Subhash, recheck FS in an hour. If FS< 311, continue to monitor pt. If FS>311 notify MD. Team will change pump settings during am rounds.

## 2018-02-07 NOTE — PROGRESS NOTE ADULT - PROBLEM SELECTOR PLAN 1
-test BG AC/HS/2AM while inpatient  -please document carb intake and bolus amount w/meals and w/all corrections  -

## 2018-02-07 NOTE — PROGRESS NOTE ADULT - SUBJECTIVE AND OBJECTIVE BOX
Patient seen and examined. comfortable in bed, No complaints  ICU Vital Signs Last 24 Hrs  T(C): 37.2 (07 Feb 2018 05:28), Max: 37.2 (07 Feb 2018 05:28)  T(F): 98.9 (07 Feb 2018 05:28), Max: 98.9 (07 Feb 2018 05:28)  HR: 92 (07 Feb 2018 05:28) (84 - 104)  BP: 108/66 (07 Feb 2018 05:28) (108/66 - 125/73)  BP(mean): --  ABP: --  ABP(mean): --  RR: 18 (07 Feb 2018 05:28) (18 - 18)  SpO2: 97% (07 Feb 2018 05:28) (97% - 100%)        PHYSICAL EXAM:  NAD, Alert  EXT:   Rt Hip: Aquacel Dressing C/D/I  No Calf Tenderness  (+) DF/PF; (+) Distal Pulses;  Sensation: No gross deficits noted      A/P: 53 yo F s/p R Total hip arthroplasty, POD 2  Pain control  PT.  DVT prophylaxis.  OOB, D/C planning.  TOV today once doing well with PT  Pt can be discharged once skinner removed and TOV successful

## 2018-02-07 NOTE — PROGRESS NOTE ADULT - PROBLEM SELECTOR PLAN 2
- due for site change today, patient has supplies with her
-c/w Insulin pump settings w/extension of temp basal (133%) for another 24 hours  -next site change 2/9-pt has relevant supplies  -pt to restart symlin once home and will restart home basal rates  -discussed plan w/pt and team  pager: [phone]

## 2018-02-07 NOTE — PROVIDER CONTACT NOTE (OTHER) - SITUATION
Pt ordered 2am FS. Pt FS was 311. Pt gave herself 7 units of insulin. Pt attempted to increase basal rate from 120% to 140%.  Pt was only able to increase pump settings to 133%.

## 2018-02-07 NOTE — PROVIDER CONTACT NOTE (OTHER) - RECOMMENDATIONS
Encourage PO intake. Continue the trial for approx. 4 hours until pt experiences discomfort.
Notify provider

## 2018-02-08 LAB
ANION GAP SERPL CALC-SCNC: 9 MMOL/L — SIGNIFICANT CHANGE UP (ref 5–17)
BUN SERPL-MCNC: 8 MG/DL — SIGNIFICANT CHANGE UP (ref 7–23)
CALCIUM SERPL-MCNC: 9 MG/DL — SIGNIFICANT CHANGE UP (ref 8.4–10.5)
CHLORIDE SERPL-SCNC: 98 MMOL/L — SIGNIFICANT CHANGE UP (ref 96–108)
CO2 SERPL-SCNC: 29 MMOL/L — SIGNIFICANT CHANGE UP (ref 22–31)
CREAT SERPL-MCNC: 0.54 MG/DL — SIGNIFICANT CHANGE UP (ref 0.5–1.3)
GLUCOSE BLDC GLUCOMTR-MCNC: 125 MG/DL — HIGH (ref 70–99)
GLUCOSE BLDC GLUCOMTR-MCNC: 196 MG/DL — HIGH (ref 70–99)
GLUCOSE SERPL-MCNC: 127 MG/DL — HIGH (ref 70–99)
HCT VFR BLD CALC: 32.1 % — LOW (ref 34.5–45)
HGB BLD-MCNC: 11.1 G/DL — LOW (ref 11.5–15.5)
MCHC RBC-ENTMCNC: 33.4 PG — SIGNIFICANT CHANGE UP (ref 27–34)
MCHC RBC-ENTMCNC: 34.5 GM/DL — SIGNIFICANT CHANGE UP (ref 32–36)
MCV RBC AUTO: 96.7 FL — SIGNIFICANT CHANGE UP (ref 80–100)
PLATELET # BLD AUTO: 235 K/UL — SIGNIFICANT CHANGE UP (ref 150–400)
POTASSIUM SERPL-MCNC: 3.8 MMOL/L — SIGNIFICANT CHANGE UP (ref 3.5–5.3)
POTASSIUM SERPL-SCNC: 3.8 MMOL/L — SIGNIFICANT CHANGE UP (ref 3.5–5.3)
RBC # BLD: 3.32 M/UL — LOW (ref 3.8–5.2)
RBC # FLD: 11.3 % — SIGNIFICANT CHANGE UP (ref 10.3–14.5)
SODIUM SERPL-SCNC: 136 MMOL/L — SIGNIFICANT CHANGE UP (ref 135–145)
WBC # BLD: 11.6 K/UL — HIGH (ref 3.8–10.5)
WBC # FLD AUTO: 11.6 K/UL — HIGH (ref 3.8–10.5)

## 2018-02-08 PROCEDURE — 97535 SELF CARE MNGMENT TRAINING: CPT

## 2018-02-08 PROCEDURE — 81025 URINE PREGNANCY TEST: CPT

## 2018-02-08 PROCEDURE — C1713: CPT

## 2018-02-08 PROCEDURE — 86901 BLOOD TYPING SEROLOGIC RH(D): CPT

## 2018-02-08 PROCEDURE — 85027 COMPLETE CBC AUTOMATED: CPT

## 2018-02-08 PROCEDURE — 97116 GAIT TRAINING THERAPY: CPT

## 2018-02-08 PROCEDURE — 80048 BASIC METABOLIC PNL TOTAL CA: CPT

## 2018-02-08 PROCEDURE — 97530 THERAPEUTIC ACTIVITIES: CPT

## 2018-02-08 PROCEDURE — 86900 BLOOD TYPING SEROLOGIC ABO: CPT

## 2018-02-08 PROCEDURE — 97110 THERAPEUTIC EXERCISES: CPT

## 2018-02-08 PROCEDURE — C1776: CPT

## 2018-02-08 PROCEDURE — 97161 PT EVAL LOW COMPLEX 20 MIN: CPT

## 2018-02-08 PROCEDURE — 72170 X-RAY EXAM OF PELVIS: CPT

## 2018-02-08 PROCEDURE — 82962 GLUCOSE BLOOD TEST: CPT

## 2018-02-08 PROCEDURE — 97165 OT EVAL LOW COMPLEX 30 MIN: CPT

## 2018-02-08 RX ORDER — OXYCODONE HYDROCHLORIDE 5 MG/1
1 TABLET ORAL
Qty: 55 | Refills: 0 | OUTPATIENT
Start: 2018-02-08

## 2018-02-08 RX ORDER — OMEGA-3 ACID ETHYL ESTERS 1 G
1 CAPSULE ORAL
Qty: 0 | Refills: 0 | COMMUNITY

## 2018-02-08 RX ORDER — TRAMADOL HYDROCHLORIDE 50 MG/1
1 TABLET ORAL
Qty: 21 | Refills: 0 | OUTPATIENT
Start: 2018-02-08 | End: 2018-02-14

## 2018-02-08 RX ORDER — VITAMIN E 100 UNIT
1 CAPSULE ORAL
Qty: 0 | Refills: 0 | COMMUNITY

## 2018-02-08 RX ORDER — UBIDECARENONE 100 MG
1 CAPSULE ORAL
Qty: 0 | Refills: 0 | COMMUNITY

## 2018-02-08 RX ORDER — ASPIRIN/CALCIUM CARB/MAGNESIUM 324 MG
1 TABLET ORAL
Qty: 0 | Refills: 0 | COMMUNITY

## 2018-02-08 RX ORDER — MELOXICAM 15 MG/1
1 TABLET ORAL
Qty: 14 | Refills: 0 | OUTPATIENT
Start: 2018-02-08 | End: 2018-02-21

## 2018-02-08 RX ORDER — POLYETHYLENE GLYCOL 3350 17 G/17G
17 POWDER, FOR SOLUTION ORAL
Qty: 0 | Refills: 0 | COMMUNITY
Start: 2018-02-08

## 2018-02-08 RX ADMIN — FONDAPARINUX SODIUM 2.5 MILLIGRAM(S): 2.5 INJECTION, SOLUTION SUBCUTANEOUS at 13:53

## 2018-02-08 RX ADMIN — Medication 1 TABLET(S): at 11:58

## 2018-02-08 RX ADMIN — Medication 100 MILLIGRAM(S): at 05:20

## 2018-02-08 RX ADMIN — TRAMADOL HYDROCHLORIDE 50 MILLIGRAM(S): 50 TABLET ORAL at 05:20

## 2018-02-08 RX ADMIN — TRAMADOL HYDROCHLORIDE 50 MILLIGRAM(S): 50 TABLET ORAL at 13:54

## 2018-02-08 RX ADMIN — TRAMADOL HYDROCHLORIDE 50 MILLIGRAM(S): 50 TABLET ORAL at 06:00

## 2018-02-08 RX ADMIN — POLYETHYLENE GLYCOL 3350 17 GRAM(S): 17 POWDER, FOR SOLUTION ORAL at 11:58

## 2018-02-08 RX ADMIN — Medication 100 MILLIGRAM(S): at 13:54

## 2018-02-08 NOTE — PROGRESS NOTE ADULT - ASSESSMENT
A/P: 53 yo female with hx of T1DM uncontrolled (HbA1C 7.9%) with cataracts and glaucoma here for R hip replacement.
Assessment: stable    Plan:   ck labs  PT WBAT  d/c home p cleared by PT  Endo note appreciated
S/P RT THR    Plan       ck labs     OOB/PT/WBAT     Sarina Tay PA-C   Beeper    [id number]
51 yo female with hx of T1DM uncontrolled (HbA1C 7.9%) with cataracts and glaucoma here for R hip replacement. Pt managing insulin pump independently. Insulin requirements higher in hospital 2/2 surgical stress response, decreased physical activity and being off symlin. Pt continuing temp basal @ 133% at this time. BG goal (100-180mg/dl). Morning BG improved today w/improved appetite. Site changed yesterday.

## 2018-02-08 NOTE — PROGRESS NOTE ADULT - PROBLEM SELECTOR PROBLEM 1
Primary osteoarthritis of right hip
Primary osteoarthritis of right hip
Type 1 diabetes mellitus, not at goal, with cataract
Type 1 diabetes mellitus with hyperglycemia

## 2018-02-08 NOTE — PROGRESS NOTE ADULT - SUBJECTIVE AND OBJECTIVE BOX
[facility]  POD #3    Patient seen and examined. comfortable in bed, No complaints    Vital Signs Last 24 Hrs  T(C): 36.9 (07 Feb 2018 23:17), Max: 37.1 (07 Feb 2018 16:09)  T(F): 98.4 (07 Feb 2018 23:17), Max: 98.7 (07 Feb 2018 16:09)  HR: 100 (07 Feb 2018 23:17) (99 - 105)  BP: 114/68 (07 Feb 2018 23:17) (110/57 - 128/72)  BP(mean): --  RR: 18 (07 Feb 2018 23:17) (18 - 18)  SpO2: 99% (07 Feb 2018 23:17) (96% - 100%)        PHYSICAL EXAM:  NAD, Alert  EXT:   Rt Hip: Aquacel Dressing C/D/I  No Calf Tenderness  (+) DF/PF; (+) Distal Pulses;  Sensation: No gross deficits noted

## 2018-10-05 NOTE — ASU DISCHARGE PLAN (ADULT/PEDIATRIC). - DIET
"Oncology Rooming Note    October 5, 2018 9:41 AM   Di Evans is a 57 year old female who presents for:    Chief Complaint   Patient presents with     Oncology Clinic Visit     return prior to tx     Initial Vitals: /72  Pulse 119  Temp 98.2  F (36.8  C) (Oral)  Wt 69.3 kg (152 lb 12.8 oz)  SpO2 98%  BMI 25.43 kg/m2 Estimated body mass index is 25.43 kg/(m^2) as calculated from the following:    Height as of 8/16/18: 1.651 m (5' 5\").    Weight as of this encounter: 69.3 kg (152 lb 12.8 oz). Body surface area is 1.78 meters squared.  Mild Pain (2) Comment: takes omeprazole with relief   No LMP recorded. Patient is postmenopausal.  Allergies reviewed: Yes  Medications reviewed: Yes    Medications: MEDICATION REFILLS NEEDED TODAY. Provider was notified.  Pharmacy name entered into [employer]: [facility] [address] [place] [address] [address] [facility] [address]    Clinical concerns: none Dr. Duque was notified.    5 minutes for nursing intake (face to face time)     Kingsley Mason RN              " no change

## 2018-12-04 NOTE — PHYSICAL THERAPY INITIAL EVALUATION ADULT - SITTING BALANCE: STATIC
Progress Notes by Nery Cabral PT at 02/01/18 08:34 AM     Author:  Nery Cabral PT Service:  (none) Author Type:  Physical Therapist     Filed:  02/01/18 10:42 AM Encounter Date:  2/1/2018 Status:  Signed     :  Nery Cabral PT (Physical Therapist)              PHYSICAL THERAPY DAILY NOTE    DIAGNOSIS: acute right side low back pain with sciatica    INSURANCE BENEFITS: Patient has 60 visits in benefit as of JAN    PHYSICIAN RECOMMENDATIONS: Evaluate and Treat     ATTENDANCE: Patient has been seen for[BM1.1C] 7[BM1.1M] visits between 12/15/2017 and[BM1.1C]  2/1/2018[BM1.1T]. Progress Summary due on or before 10th visit. SUBJECTIVE:  Patient notes[BM1.1C] she is feeling a little tight but maybe a little bit better.[BM1.1M]    OBJECTIVE PROGRESS TOWARDS GOALS:   Short Term Goals (to be achieved in 2 weeks)  1. Patient to note a decrease in pain down to 4/10 at most to allow for increase ease in sitting and standing for up to 20 minutes at one time. - being met with reports today 1/9/17  2. Patient to demonstrate improved hip extensor and abduction strength to at least 4/5 to decrease strain on low back with improved use of gluteals in daily activities. [BM1.1C] - progressing towards with progressive exercise for strengthening these areas 2/1/18[BM1.2M]  3. Patient to be independent and compliant with progressing HEP for improved carryover between therapy sessions and to allow for progression in therapy. Long Term Goals (to be achieved in 5 weeks)  1. Patient to demonstrate symmetrical passive hip extension to 15 degrees bilaterally to assist in easing walking with improved stride length and hip extension at late stance with minimal to no pain reported. 2. Patient to demonstrate appropriate biomechanics with good awareness of body for return to work type activities with little to no pain.   3. Patient able to safely lift up to 25 lbs from floor to waist height without onset of pain and appropriate "mechanics.       DAILY OBJECTIVE MEASUREMENTS:  Observation/Posture: increased lumbar lordosis    Range of Motion:   Active lumbar range of motion in degrees (*=pain):    12/15/2017  Quality of Motion   Forward Bending 38*    Right Side Bending 20 More discomfort to the right; good distribution of motion   Left Side Bending 20 Good distribution of motion   Right Rotation 15 Feels tight   Left Rotation 15 Feels tight   Backward Bending 20      Passive range of motion (*=pain):   Right  12/15/2017  Left  12/15/2017    Hip Flexion 95* 105   Hip Extension 5""very tight\"" 10\""lots of pull\""   Hip Abduction NT NT   Hip Adduction NT NT   Hip External Rotation 35 35   Hip Internal Rotation 30 30     Manual Muscle Test:   Strength per manual muscle test (*=pain):   Right  12/15/2017  Left  12/15/2017    Hip Flexion 4+/5 * 4+/5   Hip Extension with knee extended 3+/5 3+/5   Hip Extension with knee flexed N/T N/T   Hip Abduction 4+/5 3+/5   Knee Flexion 4+/5 4+/5   Knee Extension 4+/5 4+/5   Ankle Dorsiflexion 4+/5 4+/5   1st Toe Flexion N/T N/T   1st Toe Extension N/T N/T     Neurological: NT Due to no complaints    Palpation:tender through bilateral lumbar paraspinals per patient report    Joint Mobility:NT Today    Special Tests:   Trendelenberg right: Negative  Trendelenberg left: Negative  Right CAROLE test:  tight  Left CAROLE test:  tight  Right piriformis test: feels stretching pain  Left piriformis test: feels stretching pain  Supine straight leg raise right: 45 degrees with pain into thigh/maybe stretching feel per patient  Supine straight leg raise left: Negative-60 degrees  Seated straight leg raise right: a little pull in back of knee   Seated straight leg raise left: Negative  Forward flexion standing: robin move symmetrically    Functional Assessment:   Gait: ambulates fairly normally but slowly causing decreased stride length bilaterally, able to speed up just a little when asked  Stairs: able to do reciprocally " ascending and descending with minimal use of UE on railings  Functional Squats: able to do sit to stand and reverse without use of hands slowly      TREATMENT TODAY:   Time in: 8:34 am     Time out:[BM1.1C] 9:33[BM1.2M] am    Modalities - none today, may consider use of e-stim for pain as needed    Manual Therapy to decrease myofascial tightness and improve soft tissue and joint mobility:  97140 x 1 units -  1[BM1.1C]2[BM1.2M] minutes  STM to lumbar paraspinals and into posterior hips in prone  Iliopsoas release - bilaterally    Therapeutic Exercise to increase range of motion, improve flexibility, increase strength and instruct in a home exercise program:  97110 x 3 units -  4[BM1.1C]8[BM1.2M] minutes  Modified nathen stretch x 60 sec each side[BM1.1C] - deferred today[BM1.2M]  Prone hip extension x 15 each side[BM1.1C] 2#[BM1.1M]  Prone knee bend with strap stretch 20 sec x 2 each side  Prone hip abduction x 15 each side[BM1.1C] 2#  Prone heel squeeze 3 sec x 15  Prone hip rotation PTB 90-90 x 15[BM1.1M]  Side lying hip abduction x 20 both sides-cued for position to isolate more glute medius  Clams GTB x 20 both sides  Hook lying bent knee fall out GTB x 20 each side  Hook lying lower trunk rotation x 10 each  Bridging x 20 with GTB-improved height demonstrated today  TA isometric with LE march in hook lying x 10  TA with 90/90 heel tap x 10 each  Angry cat x 10  Prayer stretch 15 sec x 3  Bird dog x 10  Forward flexion stretch 10 sec x 5  Standing hip flexor stretch at stairs 20 sec x 3 each side  Sit to stand x 1[BM1.1C]5[BM1.2M]  Standing hip abduction and extension OTB x 1[BM1.1C]5[BM1.2M] each  Side stepping OTB x 1 lap  Monster walks with OTB x 1 lap[BM1.1C]  Swiss ball: pelvic tilts fwd/bwd, side to side and circles x 10 each way[BM1.2M]  Bike x 5 min     Next:[BM1.1C] progress SB activity and stabilization with trunk rotations[BM1.2M]    Precautions: nothing noted    Home exercise program (last updated (1/4/2018): Patient issued written home exercise program.  Patient demonstrated exercises correctly and was instructed to call with questions. 1-3x/day  Hook lying lower trunk rotation x 10 each  Single knee to chest 20 sec x 3 each  Single knee to opposite shoulder 20 sec x 3 each  Modified nathen x 60 sec each side  Bridging x 10  Standing hip flexor stretch at stairs 20 sec x 3 each side  Prone hip extension x 10-30  Prone knee bend strap stretch 20 sec x 3  Side lying hip abduction x 20  Side lying clams with TB x 20   TA isometric 5 sec x 10  Angry cat x 10    ASSESSMENT/TREATMENT RESPONSE:    Patient's response to treatment:[BM1.1C] noted workout fatigue following session today, continue to emphasize some strengthening to reduce her tightness feeling[BM1.2M]    Functional improvement noted:[BM1.1C] noting some decrease in pain[BM1.2M]    Prognosis for meeting goals:  excellent. Treatment will consist of activities of daily living, biomechanical training, gait training, home program, manual therapy, neuromuscular re-education and therapeutic exercise and may consider use of modalities as needed for pain. Treatment plan was discussed with the patient and verbal consent was obtained. Remaining Impairment Requiring Continued Treatment: decreased range of motion, decreased flexibility, decreased strength, pain and impairment of functional performance    PLAN:   Patient will be seen 2 times per week for 5 weeks.      Therapist Signature:[BM1.1C] Electronically Signed by:    Lexa Short PT , 2/1/2018[BM1.1T]                  Revision History        User Key Date/Time User Provider Type Action    > BM1.2 02/01/18 10:42 AM Lexa Short PT Physical Therapist Sign     BM1.1 02/01/18 08:34 AM Lexa Short PT Physical Therapist     C - Copied, M - Manual, T - Template good balance

## 2019-04-25 NOTE — PATIENT PROFILE ADULT. - ANESTHESIA, PREVIOUS REACTION, PROFILE
60 y/o female with multiple medical issues who was transferred from rehab this morning s/p rapid response due to onset of lower GI Bleed. H/H 9.4/27.5 She is S/P 4 units PRBCs, 2 Units Plasma, 1 unit Plts since this admission. Abdomen soft, non-tender. GI bleeding subsided. EGD done on 4/24/19, no source of UGIB identified. nausea/vomiting

## 2019-11-07 NOTE — H&P PST ADULT - WEIGHT IN LBS
Telephone Encounter by Heather Noel at 02/20/18 03:25 PM     Author:  Heather Noel Service:  (none) Author Type:  Admin Staff     Filed:  02/20/18 03:27 PM Encounter Date:  2/19/2018 Status:  Signed     :  Heather Noel (Admin Staff)            Ok for office to give letter to patient.  Form patient sent was already completed.  If employer needs another form they will send a new form to be filled out.  Thanks[LH1.1M]      Revision History        User Key Date/Time User Provider Type Action    > LH1.1 02/20/18 03:27 PM Heather Noel Admin Staff Sign    M - Manual            
Adequate: hears normal conversation without difficulty
156.9

## 2019-12-02 NOTE — DISCHARGE NOTE ADULT - FUNCTIONAL STATUS DATE
Addended by: NIDHI TORRES on: 12/2/2019 11:25 AM     Modules accepted: Orders     06-Feb-2018 08-Feb-2018

## 2020-08-03 NOTE — OCCUPATIONAL THERAPY INITIAL EVALUATION ADULT - LEVEL OF INDEPENDENCE: SUPINE/SIT, REHAB EVAL
Hospital Medicine  Consultant    Patient:  Jennifer Gaviria  MRN: [id number]    CHIEF COMPLAINT:    Chief Complaint   Patient presents with    Wound Infection     ring finger left hand, states that there are splinters stuck in finger       History Obtained From:  Patient, EMR  Primary Care Physician: No primary care provider on file. HISTORY OF PRESENT ILLNESS:   The patient is a 53 y.o. male with PMHx of testicular cancer; tobacco abuse (denies need for patch) and alcohol use (drinks 1-2 budlights daily but can go days without drinking) who presents with finger pain. Patient presented because of a couple of splinters getting in his finger; he tried to dig them out 3 days ago and now its swollen and tender. He denies fevers, chills, sweats, CP, SOB. Past Medical History:      Diagnosis Date    Cancer [facility])     history of testicular        Past Surgical History:      Procedure Laterality Date    BACK SURGERY      KNEE SURGERY      SKULL FRACTURE ELEVATION       Medications Prior to Admission:    Prior to Admission medications    Not on File     Allergies:  Patient has no known allergies. Social History:   TOBACCO:   reports that he has been smoking. He has a 20.00 pack-year smoking history. He has never used smokeless tobacco.  ETOH:   reports current alcohol use. Family History:   History reviewed. No pertinent family history. REVIEW OF SYSTEMS:  Ten systems reviewed and negative except for stated in HPI    Physical Exam:    Vitals: BP (!) 146/99   Pulse 77   Temp 98.3 °F (36.8 °C) (Oral)   Resp 18   Ht 5' 9\" (1.753 m)   Wt 180 lb (81.6 kg)   SpO2 97%   BMI 26.58 kg/m²   General appearance: alert, appears stated age and cooperative  Skin:  No rashes or lesions  HEENT: Head: Normal, normocephalic, atraumatic.   Neck: supple, symmetrical, trachea midline  Lungs: clear to auscultation bilaterally  Heart: regular rate and rhythm  Abdomen: soft, non-tender; bowel sounds normal; no masses,  no organomegaly  Extremities: Finger swollen and erythematous  Neurologic: Non focal    Recent Labs     08/03/20  1045   WBC 11.7*   HGB 16.3        Recent Labs     08/03/20  1045      K 3.3*      CO2 25   BUN 7   CREATININE 0.82   GLUCOSE 119*   AST 18   ALT 20   BILITOT 0.7   ALKPHOS 76     Troponin T: No results for input(s): TROPONINI in the last 72 hours. ABGs: No results found for: PHART, PO2ART, TYZ0DLC  INR:   Recent Labs     08/03/20  1628   INR 1.0     URINALYSIS:No results for input(s): NITRITE, COLORU, PHUR, LABCAST, WBCUA, RBCUA, MUCUS, TRICHOMONAS, YEAST, BACTERIA, CLARITYU, SPECGRAV, LEUKOCYTESUR, UROBILINOGEN, BILIRUBINUR, BLOODU, GLUCOSEU, AMORPHOUS in the last 72 hours. Invalid input(s): Adal Nuñez  -----------------------------------------------------------------   Xr Hand Left (min 3 Views)    Result Date: 8/3/2020  EXAMINATION:  XR HAND LEFT (MIN 3 VIEWS) HISTORY:   foreign body 4th finger    Wound/Infection Lt Fingers . Would splinter in the fourth digit. Swollen, red, losing TECHNIQUE:  XR HAND LEFT (MIN 3 VIEWS) COMPARISON: None RESULT: Soft tissue swelling involving the left ring finger. Possible small radiopaque foreign body at the level of the ring finger middle phalanx distally along the radial aspect. No acute fracture or dislocation. Joint spaces maintained. No other significant abnormality. ---------------------------------------------     Soft tissue swelling left ring finger, with possible radiopaque foreign body. Recommend clinical correlation. Assessment and Plan   1. Tenosynovitis:  Per primary team; on zosyn; will consult ID for their opinion and start vanc empirically  2. Tobacco abuse:  Patient denies need for patch  3. Alcohol use:  1-2 bud lights per day; not more; states he can go days without withdrawal; cousneled  4.  Goals of care:  Patient wants to be DNR-CCA    Patient Active Problem List   Diagnosis Code    Tenosynovitis of finger M65.9 Darwin Villanueva MD  Consultant Hospitalist    Emergency Contact: contact guard

## 2020-08-10 NOTE — PRE-OP CHECKLIST - WAS PATIENT ON BETA BLOCKER?
Dr. Valentine stated to send a picture of the site. The pt's mother agreed and had no further questions at this time.    No

## 2021-10-19 NOTE — PATIENT PROFILE ADULT. - NS PRO OT REFERRAL QUES 2 YN
Instructions: This plan will send the code FBSD to the PM system.  DO NOT or CHANGE the price.
Price (Do Not Change): 0.00
Detail Level: Generalized
no

## 2021-10-27 ENCOUNTER — NON-APPOINTMENT (OUTPATIENT)
Age: 56
End: 2021-10-27

## 2021-10-27 PROBLEM — M16.11 UNILATERAL PRIMARY OSTEOARTHRITIS, RIGHT HIP: Chronic | Status: ACTIVE | Noted: 2018-01-22

## 2021-10-27 PROBLEM — G47.30 SLEEP APNEA, UNSPECIFIED: Chronic | Status: ACTIVE | Noted: 2018-01-22

## 2021-11-19 ENCOUNTER — TRANSCRIPTION ENCOUNTER (OUTPATIENT)
Age: 56
End: 2021-11-19

## 2021-11-19 ENCOUNTER — APPOINTMENT (OUTPATIENT)
Dept: OTOLARYNGOLOGY | Facility: CLINIC | Age: 56
End: 2021-11-19
Payer: COMMERCIAL

## 2021-11-19 VITALS
HEART RATE: 84 BPM | SYSTOLIC BLOOD PRESSURE: 141 MMHG | BODY MASS INDEX: 28.71 KG/M2 | DIASTOLIC BLOOD PRESSURE: 71 MMHG | TEMPERATURE: 98 F | WEIGHT: 156 LBS | HEIGHT: 62 IN

## 2021-11-19 DIAGNOSIS — H40.9 UNSPECIFIED GLAUCOMA: ICD-10-CM

## 2021-11-19 DIAGNOSIS — Z86.79 PERSONAL HISTORY OF OTHER DISEASES OF THE CIRCULATORY SYSTEM: ICD-10-CM

## 2021-11-19 DIAGNOSIS — Z86.39 PERSONAL HISTORY OF OTHER ENDOCRINE, NUTRITIONAL AND METABOLIC DISEASE: ICD-10-CM

## 2021-11-19 PROCEDURE — 99204 OFFICE O/P NEW MOD 45 MIN: CPT | Mod: 25

## 2021-11-19 PROCEDURE — 31231 NASAL ENDOSCOPY DX: CPT

## 2021-11-19 PROCEDURE — 69210 REMOVE IMPACTED EAR WAX UNI: CPT

## 2021-11-19 RX ORDER — LATANOPROST/PF 0.005 %
DROPS OPHTHALMIC (EYE)
Refills: 0 | Status: ACTIVE | COMMUNITY

## 2021-11-19 RX ORDER — FLUTICASONE PROPIONATE 50 UG/1
50 SPRAY, METERED NASAL DAILY
Qty: 1 | Refills: 3 | Status: ACTIVE | COMMUNITY
Start: 2021-11-19 | End: 1900-01-01

## 2021-11-19 RX ORDER — OFLOXACIN OTIC 3 MG/ML
0.3 SOLUTION AURICULAR (OTIC)
Qty: 1 | Refills: 5 | Status: ACTIVE | COMMUNITY
Start: 2021-11-19 | End: 1900-01-01

## 2021-11-19 NOTE — REVIEW OF SYSTEMS
[Ear Pain] : ear pain [Hearing Loss] : hearing loss [Dizziness] : dizziness [Ear Noises] : ear noises [Negative] : Heme/Lymph

## 2021-11-19 NOTE — ASSESSMENT
[FreeTextEntry1] : Patient long history of ear issues had tubes placed in the past by Dr. Crocker comes in today complaining again of ear discomfort massive cerumen impaction bilaterally curetted out except for the left unable to remove all of it put her on Floxin otic drops will have her come back in a week to 2 to remove it patient does have diabetes is also has question of glaucoma I have encouraged her to talk to her ophthalmologist to see if Flonase nasal spray can be used if so I like her to use it for a month she will come back to once we cleaned out the rest of the wax will get a hearing test and will also contemplate putting her on a short course of a Medrol Dosepak if her ear symptoms were to persist.  All the pros and cons were discussed with her she will discuss it with her ophthalmologist as well and follow-up and see us in 1 to 2 weeks.

## 2021-11-19 NOTE — CONSULT LETTER
[Dear  ___] : Dear  [unfilled], [Consult Letter:] : I had the pleasure of evaluating your patient, [unfilled]. [Please see my note below.] : Please see my note below. [Consult Closing:] : Thank you very much for allowing me to participate in the care of this patient.  If you have any questions, please do not hesitate to contact me. [Sincerely,] : Sincerely, [FreeTextEntry3] : Nicola Youngblood MD\par [facility]\par Otolaryngology and Facial Plastics\par Associated Professor, [employer]\par

## 2021-11-19 NOTE — END OF VISIT
[FreeTextEntry3] : I saw and examined this patient in person. I have discussed with Cherelle White, Physician Assistant, in detail the above note and agree with the above assessment and plan of care.\par

## 2021-11-19 NOTE — HISTORY OF PRESENT ILLNESS
[de-identified] : Patient has a history of recurrent ear clogging especially in the left ear for years. SHe used to see an ENT but during COVID stopped going. She did have tubes in the ears as an adult twice, last time was a few years ago and has since fallen out. She does feel that the ear is muffled right now. She does not have any ringing in the ears but she does get occasionally dizzy when she wakes up in the morning that lasts for a few seconds.

## 2021-12-02 ENCOUNTER — APPOINTMENT (OUTPATIENT)
Dept: OTOLARYNGOLOGY | Facility: CLINIC | Age: 56
End: 2021-12-02
Payer: COMMERCIAL

## 2021-12-02 VITALS
TEMPERATURE: 98 F | BODY MASS INDEX: 28.71 KG/M2 | HEART RATE: 73 BPM | DIASTOLIC BLOOD PRESSURE: 65 MMHG | SYSTOLIC BLOOD PRESSURE: 119 MMHG | HEIGHT: 62 IN | WEIGHT: 156 LBS

## 2021-12-02 PROCEDURE — 99214 OFFICE O/P EST MOD 30 MIN: CPT | Mod: 25

## 2021-12-02 PROCEDURE — 92557 COMPREHENSIVE HEARING TEST: CPT

## 2021-12-02 PROCEDURE — 92567 TYMPANOMETRY: CPT

## 2021-12-02 PROCEDURE — G0268 REMOVAL OF IMPACTED WAX MD: CPT

## 2021-12-02 NOTE — HISTORY OF PRESENT ILLNESS
[de-identified] : Patient comes back 2 weeks after using the ofloxacin drops for the remaining cerumen in the left ear. SHe does not have pain in the ears and is not having any changes in hearing or ringing in the ears. She does not feel any muffling. She denies nasal congestion. She did ask her ophthalmologist about the use of the Flonase with possible glaucoma but his assocaite who called her advised its fine. SHe has been doing one spray in each nostril once  day

## 2021-12-02 NOTE — ASSESSMENT
[FreeTextEntry1] : Patient follows up wax was finally able to be removed from the left ear audiogram showed essentially normal hearing on the left a conductive loss on the right that is consistent with otosclerosis I have encouraged her to consider a stapedectomy I referred her to Dr. TINOCO for further evaluation.

## 2021-12-17 ENCOUNTER — APPOINTMENT (OUTPATIENT)
Dept: OTOLARYNGOLOGY | Facility: CLINIC | Age: 56
End: 2021-12-17
Payer: COMMERCIAL

## 2021-12-17 VITALS
HEIGHT: 62 IN | WEIGHT: 155 LBS | DIASTOLIC BLOOD PRESSURE: 71 MMHG | BODY MASS INDEX: 28.52 KG/M2 | SYSTOLIC BLOOD PRESSURE: 120 MMHG | TEMPERATURE: 98 F | HEART RATE: 82 BPM

## 2021-12-17 PROCEDURE — 99213 OFFICE O/P EST LOW 20 MIN: CPT

## 2021-12-19 NOTE — ASSESSMENT
[FreeTextEntry1] : Patient referred by  for otosclerosis noted on last audiogram. She does notice a hearing loss only when shes in a large environment. She does have hx of recurrent ear infections with 2 sets of BMT. On examination bilateral TM retraction, right Monomeric TM noted.\par \par -explained to her that due to her hx she would benefit from another set of BMT \par -if no improvement with MT will consider at CT \par \par \par f/u 6 months or prn

## 2021-12-19 NOTE — DATA REVIEWED
[de-identified] : Hearing Test performed to evaluate the extent of hearing loss and  to explain pt's symptoms\par today's hearing test was personally reviewed and revealed\par daryl RUTHERFORD-

## 2021-12-19 NOTE — HISTORY OF PRESENT ILLNESS
[M & T] : myringotomy tube [No] : patient does not have a  history of radiation therapy [de-identified] : 55 yo female\par Patient initially seen by Dr. Youngblood here for evaluation of otosclerosis that was noted on last audio. She does notice a hearing loss she feels hearing is decreased in both ears. In larger environments she cant hear well or when people whisper. Pt has no ear pain, ear drainage, tinnitus, vertigo, nasal congestion, nasal discharge, epistaxis, sinus infections, facial pain, facial pressure, throat pain, dysphagia or fevers\par h/o BMT w/ hearing improvement\par \par  [Hearing Loss] : hearing loss [Recurrent Otitis Media] : recurrent otitis media [Eustachian Tube Dysfunction] : eustachian tube dysfunction [Cholesteatoma] : no cholesteatoma [Early Onset Hearing Loss] : no early onset hearing loss [Stroke] : no stroke [Nasal Congestion] : nasal congestion [None] : The patient is currently asymptomatic.

## 2021-12-19 NOTE — END OF VISIT
[FreeTextEntry3] : I personally saw and examined MARNIE TARIQ in detail. I spoke to AMISH Price regarding the assessment and plan of care. I reviewed the above assessment and plan of care, and agree. I have made changes in changes in the body of the note where appropriate.I personally reviewed the HPI, PMH, FH, SH, ROS and medications/allergies. I have spoken to AMISH Price regarding the history and have personally determined the assessment and plan of care, and documented this myself. I was present and participated in all key portions of the encounter and all procedures noted above. I have made changes in the body of the note where appropriate.\par \par Attesting Faculty: See Attending Signature Below \par \par \par  [Time Spent: ___ minutes] : I have spent [unfilled] minutes of time on the encounter.

## 2021-12-19 NOTE — PHYSICAL EXAM
[Midline] : trachea located in midline position [de-identified] : bilateral TM retraction, right Monomeric TM  [Normal] : no rashes

## 2022-02-01 NOTE — PATIENT PROFILE ADULT. - FUNCTIONAL SCREEN CURRENT LEVEL: AMBULATION, MLM
supple no JVD supple/no JVD supple/no JVD supple/no JVD supple/no JVD no JVD no JVD (1) assistive equipment

## 2022-02-17 ENCOUNTER — APPOINTMENT (OUTPATIENT)
Dept: OTOLARYNGOLOGY | Facility: CLINIC | Age: 57
End: 2022-02-17

## 2022-02-25 ENCOUNTER — APPOINTMENT (OUTPATIENT)
Dept: OTOLARYNGOLOGY | Facility: CLINIC | Age: 57
End: 2022-02-25
Payer: COMMERCIAL

## 2022-02-25 VITALS
TEMPERATURE: 98 F | WEIGHT: 155 LBS | DIASTOLIC BLOOD PRESSURE: 66 MMHG | HEIGHT: 62 IN | SYSTOLIC BLOOD PRESSURE: 131 MMHG | HEART RATE: 81 BPM | BODY MASS INDEX: 28.52 KG/M2

## 2022-02-25 PROCEDURE — 31231 NASAL ENDOSCOPY DX: CPT

## 2022-02-25 PROCEDURE — 99214 OFFICE O/P EST MOD 30 MIN: CPT | Mod: 25

## 2022-02-25 NOTE — HISTORY OF PRESENT ILLNESS
[de-identified] : Patient has been feeling pressure in both ears and feels like she may need the tubes in both ears sooner than the spring as she was advised by Dr Ramos for bilateral retacted TMs. Recently it has just been the left ear bothering her with pressure that is moderate. She had a random episode of blood from the right ear but that resolved on its own.  She has not been doing any nasal sprays. She denies dizziness or ringing in the ears. \par She does snore at night and has occasional mild nasal congestion.

## 2022-02-25 NOTE — ASSESSMENT
[FreeTextEntry1] : Patient being followed by Dr. Hayes Mac with for eustachian tube dysfunction possibly tube placement in May right ear actually is feeling pretty good left ear seems to be clogged or starting to have some pressure consistent with eustachian tube dysfunction again endoscopically her deviated septum was confirmed on the right side we decided to put her back on Flonase and will see what happens and obviously if in May her symptoms worsen I agree with the possibility of putting tubes in her ears again to help.

## 2022-09-07 ENCOUNTER — APPOINTMENT (OUTPATIENT)
Dept: OTOLARYNGOLOGY | Facility: CLINIC | Age: 57
End: 2022-09-07
Payer: COMMERCIAL

## 2022-09-07 VITALS
DIASTOLIC BLOOD PRESSURE: 62 MMHG | BODY MASS INDEX: 28.52 KG/M2 | HEART RATE: 65 BPM | SYSTOLIC BLOOD PRESSURE: 134 MMHG | HEIGHT: 62 IN | WEIGHT: 155 LBS

## 2022-09-07 PROCEDURE — G0268 REMOVAL OF IMPACTED WAX MD: CPT

## 2022-09-07 PROCEDURE — 99214 OFFICE O/P EST MOD 30 MIN: CPT | Mod: 25

## 2022-09-07 PROCEDURE — 92557 COMPREHENSIVE HEARING TEST: CPT

## 2022-09-07 PROCEDURE — 92567 TYMPANOMETRY: CPT

## 2022-09-07 NOTE — END OF VISIT
[FreeTextEntry3] : I, Dr. Youngblood personally performed the evaluation and management (E/M) services , including all procedures, for this established patient who presents today with (a) new problem(s)/exacerbation of (an) existing condition(s). That E/M includes conducting the clinically appropriate interval history &/or exam, assessing all new/exacerbated conditions, and establishing a new plan of care. Today, my ANDREZ, Cherelle White, was here to observe &/or participate in the visit & follow plan of care established by me.\par \par \par

## 2022-09-07 NOTE — ASSESSMENT
[FreeTextEntry1] : Patient follows up but ears have been doing much better feels little clogged has some cerumen was curetted out repeat hearing test shows persistent right conductive hearing loss she has been evaluated in the past for stapedectomy decided not to proceed in that direction she will follow-up and see us annually.

## 2022-09-07 NOTE — HISTORY OF PRESENT ILLNESS
[de-identified] : Patient was last seen in February 2022 with ear clogging and itching along with pressure in the ears. SHe was supposed to come back in May for ear tube placement if her symptoms persisted. The pressure and fullness of the ears resolved. \par  She returns today after recent increase in itching in both ears, worse in the left ear. SHe feels that there is a dryness in the ears as well. She denies ringing in the ears or dizziness \par She does feel dry skin around the outside of both ears. \par She stopped using the nasal sprays but she is not having nay pressure in the ears so did not think it was necessary.

## 2022-10-26 NOTE — ASU PREOP CHECKLIST - 1.
unable to follow commands Patient has insulin pump and placed on hold for fingerstick of 122, Dr York made aware.

## 2023-06-06 ENCOUNTER — APPOINTMENT (OUTPATIENT)
Dept: OTOLARYNGOLOGY | Facility: CLINIC | Age: 58
End: 2023-06-06
Payer: COMMERCIAL

## 2023-06-06 VITALS
BODY MASS INDEX: 30.36 KG/M2 | DIASTOLIC BLOOD PRESSURE: 74 MMHG | TEMPERATURE: 98.4 F | HEIGHT: 62 IN | SYSTOLIC BLOOD PRESSURE: 130 MMHG | HEART RATE: 82 BPM | WEIGHT: 165 LBS

## 2023-06-06 DIAGNOSIS — H61.22 IMPACTED CERUMEN, LEFT EAR: ICD-10-CM

## 2023-06-06 PROCEDURE — 99212 OFFICE O/P EST SF 10 MIN: CPT

## 2023-06-06 PROCEDURE — 92567 TYMPANOMETRY: CPT

## 2023-06-06 PROCEDURE — 92557 COMPREHENSIVE HEARING TEST: CPT

## 2023-06-06 NOTE — HISTORY OF PRESENT ILLNESS
[de-identified] : Patient comes in with ear clogging bilaterally. She does not have any pain in the ears but felt there was some discharge coming from the left ear. SHe denies ringing in the ear or dizziness recently.

## 2023-06-06 NOTE — ASSESSMENT
[FreeTextEntry1] : Patient clogged cerumen impaction predominantly in the left side curetted out tympanic membranes within normal limits audiogram shows that her hearing is stable she will follow-up and see us annually.

## 2023-06-06 NOTE — PHYSICAL EXAM
[Nasal Endoscopy Performed] : nasal endoscopy was performed, see procedure section for findings [] : septum deviated bilaterally [Midline] : trachea located in midline position [Normal] : no rashes [de-identified] : cerumen in the ear canals; once removed normal EACs

## 2023-06-06 NOTE — REVIEW OF SYSTEMS
[As Noted in HPI] : as noted in HPI [Ear Drainage] : ear drainage [Negative] : Heme/Lymph [de-identified] : ear clogging

## 2023-09-14 ENCOUNTER — APPOINTMENT (OUTPATIENT)
Dept: OTOLARYNGOLOGY | Facility: CLINIC | Age: 58
End: 2023-09-14
Payer: COMMERCIAL

## 2023-09-14 VITALS
DIASTOLIC BLOOD PRESSURE: 75 MMHG | SYSTOLIC BLOOD PRESSURE: 144 MMHG | TEMPERATURE: 97.9 F | BODY MASS INDEX: 30.36 KG/M2 | HEIGHT: 62 IN | HEART RATE: 77 BPM | WEIGHT: 165 LBS

## 2023-09-14 DIAGNOSIS — H93.8X3 OTHER SPECIFIED DISORDERS OF EAR, BILATERAL: ICD-10-CM

## 2023-09-14 DIAGNOSIS — H61.23 IMPACTED CERUMEN, BILATERAL: ICD-10-CM

## 2023-09-14 DIAGNOSIS — L30.9 DERMATITIS, UNSPECIFIED: ICD-10-CM

## 2023-09-14 DIAGNOSIS — H69.83 OTHER SPECIFIED DISORDERS OF EUSTACHIAN TUBE, BILATERAL: ICD-10-CM

## 2023-09-14 PROCEDURE — 99214 OFFICE O/P EST MOD 30 MIN: CPT | Mod: 25

## 2023-09-14 PROCEDURE — 92557 COMPREHENSIVE HEARING TEST: CPT

## 2023-09-14 PROCEDURE — 92567 TYMPANOMETRY: CPT

## 2023-09-14 PROCEDURE — G0268 REMOVAL OF IMPACTED WAX MD: CPT

## 2023-09-14 RX ORDER — MOMETASONE FUROATE 1 MG/G
0.1 CREAM TOPICAL
Qty: 1 | Refills: 0 | Status: ACTIVE | COMMUNITY
Start: 2023-09-14 | End: 1900-01-01

## 2023-11-13 ENCOUNTER — APPOINTMENT (OUTPATIENT)
Dept: NEUROLOGY | Facility: CLINIC | Age: 58
End: 2023-11-13
Payer: COMMERCIAL

## 2023-11-13 VITALS
DIASTOLIC BLOOD PRESSURE: 75 MMHG | SYSTOLIC BLOOD PRESSURE: 136 MMHG | WEIGHT: 165 LBS | HEIGHT: 62 IN | HEART RATE: 78 BPM | BODY MASS INDEX: 30.36 KG/M2

## 2023-11-13 DIAGNOSIS — H90.3 SENSORINEURAL HEARING LOSS, BILATERAL: ICD-10-CM

## 2023-11-13 DIAGNOSIS — R26.89 OTHER ABNORMALITIES OF GAIT AND MOBILITY: ICD-10-CM

## 2023-11-13 DIAGNOSIS — E10.42 TYPE 1 DIABETES MELLITUS WITH DIABETIC POLYNEUROPATHY: ICD-10-CM

## 2023-11-13 PROCEDURE — 99204 OFFICE O/P NEW MOD 45 MIN: CPT

## 2024-02-05 ENCOUNTER — APPOINTMENT (OUTPATIENT)
Dept: NEUROLOGY | Facility: CLINIC | Age: 59
End: 2024-02-05

## 2024-04-10 NOTE — ASU PREOP CHECKLIST - RESPIRATORY RATE (BREATHS/MIN)
Triamcinolone      Last Written Prescription Date:  12/2/20  Last Fill Quantity: 30g,   # refills: 1  Last Office Visit: 9/28/23  Future Office visit:       Routing refill request to provider for review/approval because:        
17